# Patient Record
Sex: FEMALE | Race: WHITE | NOT HISPANIC OR LATINO | Employment: FULL TIME | URBAN - METROPOLITAN AREA
[De-identification: names, ages, dates, MRNs, and addresses within clinical notes are randomized per-mention and may not be internally consistent; named-entity substitution may affect disease eponyms.]

---

## 2021-02-11 ENCOUNTER — TRANSFERRED RECORDS (OUTPATIENT)
Dept: MULTI SPECIALTY CLINIC | Facility: CLINIC | Age: 31
End: 2021-02-11

## 2021-02-11 LAB — PAP-ABSTRACT: NORMAL

## 2021-06-01 ENCOUNTER — RECORDS - HEALTHEAST (OUTPATIENT)
Dept: ADMINISTRATIVE | Facility: CLINIC | Age: 31
End: 2021-06-01

## 2021-08-30 ENCOUNTER — OFFICE VISIT (OUTPATIENT)
Dept: FAMILY MEDICINE | Facility: OTHER | Age: 31
End: 2021-08-30
Attending: NURSE PRACTITIONER
Payer: COMMERCIAL

## 2021-08-30 VITALS
TEMPERATURE: 97.4 F | HEART RATE: 67 BPM | WEIGHT: 151.9 LBS | SYSTOLIC BLOOD PRESSURE: 116 MMHG | BODY MASS INDEX: 25.31 KG/M2 | RESPIRATION RATE: 18 BRPM | HEIGHT: 65 IN | DIASTOLIC BLOOD PRESSURE: 78 MMHG | OXYGEN SATURATION: 97 %

## 2021-08-30 DIAGNOSIS — T63.481A LOCAL REACTION TO INSECT STING, ACCIDENTAL OR UNINTENTIONAL, INITIAL ENCOUNTER: Primary | ICD-10-CM

## 2021-08-30 PROCEDURE — 99203 OFFICE O/P NEW LOW 30 MIN: CPT | Performed by: NURSE PRACTITIONER

## 2021-08-30 RX ORDER — BUSPIRONE HYDROCHLORIDE 5 MG/1
TABLET ORAL
COMMUNITY
Start: 2021-08-05 | End: 2022-09-09

## 2021-08-30 RX ORDER — ESCITALOPRAM OXALATE 20 MG/1
TABLET ORAL
COMMUNITY
Start: 2021-06-21 | End: 2022-09-09

## 2021-08-30 RX ORDER — PREDNISONE 20 MG/1
20 TABLET ORAL 2 TIMES DAILY
Qty: 6 TABLET | Refills: 0 | Status: SHIPPED | OUTPATIENT
Start: 2021-08-30 | End: 2021-09-02

## 2021-08-30 ASSESSMENT — MIFFLIN-ST. JEOR: SCORE: 1406.95

## 2021-08-30 ASSESSMENT — PAIN SCALES - GENERAL: PAINLEVEL: NO PAIN (0)

## 2021-08-31 NOTE — PROGRESS NOTES
"HPI:    Raina Horta is a 31 year old female  who presents to [facility] today for insect sting    She was stung by a wasp/hornet 2 days ago on her right arm.  She is having pain, redness, and itching initially.  Today the redness and itching has intensified.  She has had similar reactions in the past.  No fevers. She is concerned about a possible skin infection.  She denies any anaphylactic symptoms such as throat swelling, scratchiness or thickness, lip swelling or tingling, tongue thickness or swelling, chest tightness, shortness of breath or difficulty breathing.     She took Benadryl last night without relief.        History reviewed. No pertinent past medical history.  History reviewed. No pertinent surgical history.  Social History     Tobacco Use     Smoking status: Never Smoker     Smokeless tobacco: Never Used   Substance Use Topics     Alcohol use: Yes     Current Outpatient Medications   Medication Sig Dispense Refill     busPIRone (BUSPAR) 5 MG tablet TAKE 1 TABLET BY MOUTH ONE TIME A DAY.       escitalopram (LEXAPRO) 20 MG tablet TAKE 1 TABLET BY MOUTH ONE TIME A DAY.       Allergies   Allergen Reactions     Gentamycin [Gentamicin Sulfate]      Eye drops, swelling of the eyes and redness     Loracarbef Rash         Past medical history, past surgical history, current medications and allergies reviewed and accurate to the best of my knowledge.        ROS:  Refer to HPI    /78   Pulse 67   Temp 97.4  F (36.3  C) (Tympanic)   Resp 18   Ht 1.638 m (5' 4.5\")   Wt 68.9 kg (151 lb 14.4 oz)   SpO2 97%   BMI 25.67 kg/m      EXAM:  General Appearance: Well appearing adult female, appropriate appearance for age. No acute distress  Respiratory: normal chest wall and respirations.  Normal effort.  No cough appreciated.  Cardiovascular:  CMS intact to right upper extremity    Musculoskeletal:  Equal movement of bilateral upper extremities.  Equal movement of bilateral lower extremities.  " Normal gait.    Dermatological: Right inner forearm with area of swelling, erythema, warmth, and tenderness measuring 4 cm x 10 cm consistent with insect bite with local reaction.  Psychological: normal affect, alert, oriented, and pleasant.           ASSESSMENT/PLAN:    I have reviewed the nursing notes.  I have reviewed the findings, diagnosis, plan and need for follow up with the patient.    (T63.943E) Local reaction to insect sting, accidental or unintentional, initial encounter  (primary encounter diagnosis)  Comment: right forearm    - Prednisone Take 1 tablet (20 mg) by mouth 2 times daily for 3 days, Disp-6 tablet, R-0, E-Prescribe  Zyrtec 1-2 tabs daily in the morning  Benadryl 1-2 tabs daily at bedtime  May use over-the-counter Tylenol or ibuprofen PRN     Symptomatic treatment including cool compresses or ice packs, hydrocortisone cream, elevation, etc.     Discussed warning signs/symptoms indicative of need to f/u     Follow up if symptoms persist or worsen or concerns       I explained my diagnostic considerations and recommendations to the patient, who voiced understanding and agreement with the treatment plan. All questions were answered. We discussed potential side effects of any prescribed or recommended therapies, as well as expectations for response to treatments.

## 2021-08-31 NOTE — PATIENT INSTRUCTIONS
Prednisone one tab twice daily x 3 days, take with food    Benadryl at bedtime    Zyrtec 1 to 2 tabs in the morning    Cool packs for itching and swelling    Hydrocortisone for itching

## 2021-08-31 NOTE — NURSING NOTE
Chief Complaint   Patient presents with     Insect Bites     She was bit by flying insect on her right keri on Saturday.    Initial There were no vitals taken for this visit. There is no height or weight on file to calculate BMI.     FOOD SECURITY SCREENING QUESTIONS  Hunger Vital Signs:  Within the past 12 months we worried whether our food would run out before we got money to buy more. Never  Within the past 12 months the food we bought just didn't last and we didn't have money to get more. Never      Medication Reconciliation: Complete      Raji Hopkins LPN

## 2021-09-07 ENCOUNTER — OFFICE VISIT (OUTPATIENT)
Dept: FAMILY MEDICINE | Facility: OTHER | Age: 31
End: 2021-09-07
Attending: PHYSICIAN ASSISTANT
Payer: COMMERCIAL

## 2021-09-07 VITALS
HEART RATE: 75 BPM | SYSTOLIC BLOOD PRESSURE: 118 MMHG | TEMPERATURE: 98.7 F | WEIGHT: 146.1 LBS | HEIGHT: 65 IN | RESPIRATION RATE: 16 BRPM | DIASTOLIC BLOOD PRESSURE: 64 MMHG | BODY MASS INDEX: 24.34 KG/M2 | OXYGEN SATURATION: 98 %

## 2021-09-07 DIAGNOSIS — T63.441A BEE STING REACTION, ACCIDENTAL OR UNINTENTIONAL, INITIAL ENCOUNTER: Primary | ICD-10-CM

## 2021-09-07 PROCEDURE — 99213 OFFICE O/P EST LOW 20 MIN: CPT | Performed by: NURSE PRACTITIONER

## 2021-09-07 RX ORDER — PREDNISONE 20 MG/1
20 TABLET ORAL 2 TIMES DAILY
Qty: 6 TABLET | Refills: 0 | Status: SHIPPED | OUTPATIENT
Start: 2021-09-07 | End: 2021-09-10

## 2021-09-07 ASSESSMENT — PAIN SCALES - GENERAL: PAINLEVEL: MILD PAIN (2)

## 2021-09-07 ASSESSMENT — MIFFLIN-ST. JEOR: SCORE: 1380.65

## 2021-09-07 NOTE — NURSING NOTE
"Chief Complaint   Patient presents with     Insect Bites     Patient is here for a bee sting that happened yesterday on her left thumb. Patient now has some swelling to the hand. Patient took benadryl yesterday and today with no relief.     Initial /64   Pulse 75   Temp 98.7  F (37.1  C) (Tympanic)   Resp 16   Ht 1.638 m (5' 4.5\")   Wt 66.3 kg (146 lb 1.6 oz)   SpO2 98%   BMI 24.69 kg/m   Estimated body mass index is 24.69 kg/m  as calculated from the following:    Height as of this encounter: 1.638 m (5' 4.5\").    Weight as of this encounter: 66.3 kg (146 lb 1.6 oz).  Medication Reconciliation: complete    Brandi Montez LPN  "

## 2021-09-07 NOTE — PROGRESS NOTES
ASSESSMENT/PLAN:  1. Bee sting reaction, accidental or unintentional, initial encounter    - predniSONE (DELTASONE) 20 MG tablet; Take 1 tablet (20 mg) by mouth 2 times daily for 3 days  Dispense: 6 tablet; Refill: 0      Discussed with patient that based on her symptoms and physical exam findings that she will be prescribed prednisone 20 mg BID x 3 days.     Do not take Ibuprofen while on prednisone.  May use over-the-counter Tylenol PRN  Zyrtec (generic) 2 tabs every morning until swelling resolves  Benadryl at bedtime until swelling resolves  Cool compresses or ice packs frequent to areas of swelling     Discussed with patient signs/symptoms of delayed anaphylactic reaction ( lip, tongue, or throat numbness, tingling, swelling, or difficulty talking or swallowing or difficulty breathing, feeling of need to clear throat, etc) and need to seek immediate medical attention - call 911 or go to ER.      Discussed warning signs/symptoms indicative of need to f/u    Follow up if symptoms persist or worsen or concerns      I explained my diagnostic considerations and recommendations to the patient, who voiced understanding and agreement with the treatment plan. All questions were answered. We discussed potential side effects of any prescribed or recommended therapies, as well as expectations for response to treatments.        HPI:    Raina Horta is a 31 year old female  who presents to [facility] today for a bee sting to her left thumb. This occurred yesterday. The patient reports taking benadryl yesterday and applied a cool compress. She states that today her hand started swelling. The patient was just into the clinic last week for a bee sting. Left hand erythema, itching and swelling. Slowly worsening in regards to swelling and erythema today.     History reviewed. No pertinent past medical history.  History reviewed. No pertinent surgical history.  Social History     Tobacco Use     Smoking status: Never  "Smoker     Smokeless tobacco: Never Used   Substance Use Topics     Alcohol use: Yes     Current Outpatient Medications   Medication Sig Dispense Refill     busPIRone (BUSPAR) 5 MG tablet TAKE 1 TABLET BY MOUTH ONE TIME A DAY.       escitalopram (LEXAPRO) 20 MG tablet TAKE 1 TABLET BY MOUTH ONE TIME A DAY.       Allergies   Allergen Reactions     Gentamycin [Gentamicin Sulfate]      Eye drops, swelling of the eyes and redness     Loracarbef Rash         Past medical history, past surgical history, current medications and allergies reviewed and accurate to the best of my knowledge.        ROS:  Refer to HPI    /64   Pulse 75   Temp 98.7  F (37.1  C) (Tympanic)   Resp 16   Ht 1.638 m (5' 4.5\")   Wt 66.3 kg (146 lb 1.6 oz)   SpO2 98%   BMI 24.69 kg/m      EXAM:  General Appearance: Well appearing female, appropriate appearance for age. No acute distress  Orophayrnx: moist mucous membranes, posterior pharynx without erythema, tonsils without hypertrophy, no erythema, no exudates or petechiae, no post nasal drip seen, no trismus, voice clear.    Musculoskeletal:  Equal movement of bilateral upper extremities.  Equal movement of bilateral lower extremities.  Normal gait.    Dermatological: Erythema and swelling to the posterior aspect of her left hand.   Psychological: normal affect, alert, oriented, and pleasant.             "

## 2021-09-07 NOTE — PATIENT INSTRUCTIONS
"Do not take Ibuprofen while on prednisone.  May use over-the-counter Tylenol PRN  Zyrtec (generic) 2 tabs every morning until swelling resolves  Benadryl at bedtime until swelling resolves  Cool compresses or ice packs frequent to areas of swelling     Discussed with patient signs/symptoms of delayed anaphylactic reaction ( lip, tongue, or throat numbness, tingling, swelling, or difficulty talking or swallowing or difficulty breathing, feeling of need to clear throat, etc) and need to seek immediate medical attention - call 911 or go to ER.    Patient Education     Local Reaction to an Insect Sting    You have been stung or bitten by an insect. The insect s venom or body fluid is causing your skin to react in the area where you were stung or bitten. This often causes redness, itching, and swelling. This reaction will often fade over a few hours. But it can last a few days. An insect bite or sting can become infected 1 to 3 days later. So watch for the signs below. Sometimes it is hard to tell the difference between a local reaction to the insect bite or sting and an early infection. Your healthcare provider may give you antibiotics.  Common stinging insects that cause reactions are wasps, bees, yellow jackets, fire ants, and hornets. Common bites are from spiders, mosquitoes, fleas, or ticks. Other types of insects may be more common in different parts of the country or world.  Insect venom causes \"local\" toxic reactions in everyone. Allergic reactions occur only in those who are already sensitive to the venom. The severity of your reaction to the insect sting depends on the dose of venom and the degree to which you are already sensitive to it. Most people think of allergic reactions when someone has a rash or itchy skin. But most stings cause \"localized\" symptoms that are not allergic. These symptoms can include:    Rash, redness, welts, or blisters around the sting or bite    Itching, burning, stinging, or " pain    Swelling around the sting area, which may spread and become uncomfortable    Home care  Medicines  The healthcare provider may prescribe medicines to ease swelling, itching, and pain. Follow the provider s instructions when taking these medicines.    Diphenhydramine is an oral antihistamine you can find in stores.. You can use this medicine to reduce itching and swelling. The medicine may make you sleepy. So be careful using it in the daytime or when going to school, working, or driving. Don t use diphenhydramine if you have glaucoma or if you are a man with trouble urinating because of an enlarged prostate. Other antihistamines may cause less drowsiness. They may be better choices for daytime use. Ask your pharmacist for suggestions.    If you have large areas of localized swelling, you may be prescribed oral corticosteroids, such as prednisone. These can help decrease the swelling and discomfort.    Don t use diphenhydramine cream on your skin. In some people, it can cause more of a localized skin rash due to allergy to the cream.    Calamine lotion or oatmeal baths sometimes help with itching.    You may use acetaminophen or ibuprofen to control pain, unless another pain medicine was prescribed. Talk with your healthcare provider before using these medicines if you have chronic liver or kidney disease. Also talk with your provider if you ve had a stomach ulcer or gastrointestinal (GI) bleeding.    If you had a severe reaction, the provider may prescribe an epinephrine auto-injector. Epinephrine is an emergency medicine that will stop an allergic reaction from getting worse. Before you leave the hospital, be sure that you understand when and how to use this medicine.  General care      If itching is a problem, don t take hot showers or baths. Stay out of direct sunlight. These heat up your skin and will make the itching worse.    Use an ice pack to reduce local areas of redness, swelling, and itching. You  can make your own ice pack by putting ice cubes in a bag that seals and wrapping it in a thin towel. Don t put the ice directly on your skin, because it can damage the skin.    Try not to scratch any affected areas to prevent damage to the skin or infection.    If oral antibiotics or oral corticosteroids were prescribed, be sure to take them as directed until finished.  Tips for dealing with insect stings    Be aware that honeybees nest in trees. Wasps and yellow jackets nest in the ground, trees, or roof eaves.    If you are stung by a honeybee, a stinger may remain in your skin. Wasps, yellow jackets, and hornets don t leave a stinger behind. Move away from the nest area right away. The stinger of a honeybee releases a substance that will attract other bees to you. Once you are away from the nest, remove the stinger as quickly as possible.    After any sting, you may apply ice and take diphenhydramine or another antihistamine. If you develop any of the warning signs below, seek help right away.    If your reaction includes dizziness, fainting, or trouble breathing or swallowing, ask your healthcare provider if you need epinephrine auto-injectors.    Follow-up care  Follow up with your healthcare provider in 2 days, or as advised, if your symptoms don t start to get better.  Call 911  Call 911 if any of these occur:    Trouble breathing or swallowing, or wheezing    New or worsening swelling in the mouth, throat, or tongue    Hoarse voice or trouble speaking    Confusion    Severe drowsiness or trouble awakening    Fainting or loss of consciousness    Rapid heart rate    Low blood pressure    Feeling of doom    Nausea, vomiting, abdominal pain, or diarrhea    Vomiting blood, or large amounts of blood in stool    Seizure  When to seek medical advice  Call your healthcare provider or get medical care right away if any of these occur:    Spreading areas of itching, redness, or swelling    New or worse swelling in the  face, eyelids, or lips    Dizziness or weakness  Also call your provider right away if you have signs of infection:    Increasing pain, redness, or swelling    Fever of 100.4 F (38 C) or higher, or as directed by your healthcare provider    Fluid or pus draining from the sting area   Mariela last reviewed this educational content on 10/1/2019    9677-0351 [employer]. All rights reserved. This information is not intended as a substitute for professional medical care. Always follow your healthcare professional's instructions.

## 2021-10-12 ENCOUNTER — ALLIED HEALTH/NURSE VISIT (OUTPATIENT)
Dept: FAMILY MEDICINE | Facility: OTHER | Age: 31
End: 2021-10-12
Attending: FAMILY MEDICINE
Payer: COMMERCIAL

## 2021-10-12 DIAGNOSIS — R52 BODY ACHES: ICD-10-CM

## 2021-10-12 DIAGNOSIS — R53.83 FATIGUE: ICD-10-CM

## 2021-10-12 DIAGNOSIS — J02.0 STREPTOCOCCAL SORE THROAT: Primary | ICD-10-CM

## 2021-10-12 PROCEDURE — C9803 HOPD COVID-19 SPEC COLLECT: HCPCS

## 2021-10-12 PROCEDURE — U0005 INFEC AGEN DETEC AMPLI PROBE: HCPCS | Mod: ZL

## 2021-10-13 LAB — SARS-COV-2 RNA RESP QL NAA+PROBE: NEGATIVE

## 2021-11-27 ENCOUNTER — HEALTH MAINTENANCE LETTER (OUTPATIENT)
Age: 31
End: 2021-11-27

## 2022-02-11 ENCOUNTER — PATIENT OUTREACH (OUTPATIENT)
Dept: FAMILY MEDICINE | Facility: OTHER | Age: 32
End: 2022-02-11
Payer: COMMERCIAL

## 2022-02-11 NOTE — TELEPHONE ENCOUNTER
Patient Quality Outreach    Patient is due for the following:   Asthma  -  ACT needed and Asthma follow-up visit  Cervical Cancer Screening - PAP Needed  Depression  -  PHQ-9 Needed  Physical  - Due after 11/30/2020    NEXT STEPS:   Chart routed to abstraction.      Type of outreach:    Sent Gip[employer] message.      Questions for provider review:    None     Lisa Medley NP  Chart routed to N/A.

## 2022-09-06 ENCOUNTER — ALLIED HEALTH/NURSE VISIT (OUTPATIENT)
Dept: FAMILY MEDICINE | Facility: OTHER | Age: 32
End: 2022-09-06
Attending: PHYSICIAN ASSISTANT
Payer: COMMERCIAL

## 2022-09-06 DIAGNOSIS — J02.9 SORE THROAT: Primary | ICD-10-CM

## 2022-09-06 DIAGNOSIS — R09.81 STUFFY NOSE: ICD-10-CM

## 2022-09-06 DIAGNOSIS — R52 BODY ACHES: ICD-10-CM

## 2022-09-06 DIAGNOSIS — R05.9 COUGH: ICD-10-CM

## 2022-09-06 PROCEDURE — U0005 INFEC AGEN DETEC AMPLI PROBE: HCPCS | Mod: ZL

## 2022-09-06 PROCEDURE — C9803 HOPD COVID-19 SPEC COLLECT: HCPCS

## 2022-09-06 NOTE — NURSING NOTE
Patient swabbed for COVID-19 testing.  Holley Rivera LPN on 9/6/2022 at 12:54 PM    symptomatic sore throat, body ache, cough, stuffy nose

## 2022-09-07 LAB — SARS-COV-2 RNA RESP QL NAA+PROBE: POSITIVE

## 2022-09-19 ENCOUNTER — VIRTUAL VISIT (OUTPATIENT)
Dept: OBGYN | Facility: OTHER | Age: 32
End: 2022-09-19
Attending: OBSTETRICS & GYNECOLOGY
Payer: COMMERCIAL

## 2022-09-19 VITALS — HEIGHT: 65 IN | WEIGHT: 155 LBS | BODY MASS INDEX: 25.83 KG/M2

## 2022-09-19 DIAGNOSIS — Z32.01 PREGNANCY TEST POSITIVE: Primary | ICD-10-CM

## 2022-09-19 PROCEDURE — 99207 PR OB VISIT-NO CHARGE - GICH ONLY: CPT

## 2022-09-19 RX ORDER — PRENATAL VIT/IRON FUM/FOLIC AC 27MG-0.8MG
1 TABLET ORAL DAILY
COMMUNITY

## 2022-09-19 ASSESSMENT — PATIENT HEALTH QUESTIONNAIRE - PHQ9: SUM OF ALL RESPONSES TO PHQ QUESTIONS 1-9: 10

## 2022-09-19 NOTE — PROGRESS NOTES
Verbal consent obtained for telephone visit. Total length of call: 35 min    HPI:    This is a 32 year old female patient,  who was called today for OB Intake visit. Patient reports positive pregnancy test at home.     Obstetrical history and OB Demographics updated to the best of this nurse's ability based on patient report. PHQ-9 depression screening and routine Domestic Abuse screening completed. All immediate questions and concerns answered.    FOOD SECURITY SCREENING QUESTIONS:    The next two questions are to help us understand your food security.  If you are feeling you need any assistance in this area, we have resources available to support you today.    Hunger Vital Signs:  Within the past 12 months we worried whether our food would run out before we got money to buy more. Never  Within the past 12 months the food we bought just didn't last and we didn't have money to get more. Never    Last menstrual period is reported as Patient's last menstrual period was 2022 (exact date). MACKENZIE based on LMP is Estimated Date of Delivery: May 10, 2023.  Her cycles are regular, IUD eliminated menstration.  Her last menstrual period was normal.   Since her LMP, she has experienced  nausea, fatigue, headache and vaginal bleeding- light spotting now resolved 2 weeks ago approximately.       OBSTETRIC HISTORY:    OB History    Para Term  AB Living   1 0 0 0 0 0   SAB IAB Ectopic Multiple Live Births   0 0 0 0 0      # Outcome Date GA Lbr Luis Alberto/2nd Weight Sex Delivery Anes PTL Lv   1 Current                Age of first pregnancy: 32  Previous OB Provider: n/a  Previous Delivering Clinic: n/a  Release of Records: [facility]- patient to request record transfer.    Current delivery plan: GICH  Preferred OB Provider: Dawson Gomez MD  Current Primary Care Provider: Dr. Telma Card- [facility]  Pediatrician: will consider options    Additional History: IUD removed 2022.  Patient is tearful and states much anxiety surrounding work place since start of new job in [place], April 2022. Not sleeping well and states has some noted history of seasonal affective disorder. Stopped anxiety/depression medications as of April 2022 after feeling she was stable.     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     IgA deficiency diagnosis in [facility], medical alert to not receive an IgA blood transfusions.     Most recently has been dealing with COVID positive diagnosis as of 9/6/2022 and completed antibiotic course 9/18/22 from sinus infection.     Have you travelled during the pregnancy?No  Have your sexual partner(s) travelled during the pregnancy?No      HISTORY:   Planned Pregnancy: Yes  Marital Status:   Occupation: - full time   Living in Household: Spouse and Other:  step child    Father of the baby is involved.   Family and father of baby is supportive of current pregnancy.  Past Medical History of Father of Baby:No significant medical history    Past History:  Her past medical history   Past Medical History:   Diagnosis Date     Acid reflux      Anxiety      Depression      Fructose intolerance      IgA deficiency (H)     Diagnosed in [facility]. NO blood trasnfusions with IgA- medical alert   .      Her past surgical history:   Past Surgical History:   Procedure Laterality Date     SINUS SURGERY  2020       She has a history of  no prior pregnancies    Since her last LMP she denies use of alcohol, tobacco and street drugs.    Pap smear history: Last 3 Pap Results: No results found for: PAP    STD/STI history: No STD history    STD/STI symptoms: itching     Past medical, surgical, social and family history were reviewed and updated in EPIC.    Medications reviewed by this nurse. Current medication list:  Current Outpatient Medications   Medication Sig Dispense  Refill     Prenatal Vit-Fe Fumarate-FA (PRENATAL MULTIVITAMIN W/IRON) 27-0.8 MG tablet Take 1 tablet by mouth daily       The following medications were recommended to be discontinued due to Pregnancy Category D status: ibuprofen  Patient informed to contact her primary care provider as soon as possible to discuss a safer alternative.    Risk factors:  Moderate and moderately severe risks (consult with OB/Gyn)  Previous fetal or  demise: No  History of  delivery: No  History of heart disease Class I: No  Severe anemia, unresponsive to iron therapy: No  Pelvic mass or neoplasm: No  Previous : No  Hyper/hypothyroidism: No  History of postpartum hemorrhage requiring transfusion:No  History of Placenta Accreta: No    High Risk (Pregnancy managed by OB/Gyn)  Multiple pregnancy: No  Pre-gestational diabetes: No  Chronic Hypertension: No  Renal Failure: No  Heart disease, class II or greater: No  Rh Isoimmunization: unknown blood type  Chronic active hepatitis: No  Convulsive disorder, poorly controlled: No  Isoimmune thrombocytopenia: No  Pre-term premature rupture of membranes: No  Lupus or other autoimmune disorder: No  Human Immunodeficiency Virus: No      ASSESSMENT/PLAN:       ICD-10-CM    1. Pregnancy test positive  Z32.01        32 year old , 6w5d of pregnancy with MACKENZIE of 5/10/2023, Alternate MACKENZIE Entry    Per standing orders and scope of practice of this nurse, patient will have the following orders placed and completed prior to initial OB visit with the appropriate provider:    --early ultrasound for dating and viability ordered for 6+ weeks gestation based on LMP    --Quantitative Beta HCG and progesterone monitoring if indicated    Counseling given:     - Recommended weight gain for pregnancy: 15-25 lbs.   BMI < 18.5  28-40 lbs   18.5 - 24.9 25-35   25 - 29.9 15-25   > 30  < 15       PLAN/PATIENT INSTRUCTIONS:    Normal exercise.  Normal sexual activity.  Prenatal  vitamins.  Anticipated weight gain.    follow-up appointment with Dr. Dawson Gomez for pre- care and take multivitamin or pre- vitamins    Peyton Long RN.................................................. 2022 3:16 PM

## 2022-10-06 ENCOUNTER — PRENATAL OFFICE VISIT (OUTPATIENT)
Dept: OBGYN | Facility: OTHER | Age: 32
End: 2022-10-06
Attending: OBSTETRICS & GYNECOLOGY
Payer: COMMERCIAL

## 2022-10-06 VITALS
BODY MASS INDEX: 27.04 KG/M2 | WEIGHT: 160 LBS | DIASTOLIC BLOOD PRESSURE: 70 MMHG | HEART RATE: 77 BPM | SYSTOLIC BLOOD PRESSURE: 120 MMHG

## 2022-10-06 DIAGNOSIS — F43.22 ADJUSTMENT DISORDER WITH ANXIOUS MOOD: ICD-10-CM

## 2022-10-06 DIAGNOSIS — Z34.81 ENCOUNTER FOR SUPERVISION OF OTHER NORMAL PREGNANCY IN FIRST TRIMESTER: Primary | ICD-10-CM

## 2022-10-06 LAB
ALBUMIN UR-MCNC: NEGATIVE MG/DL
APPEARANCE UR: CLEAR
BILIRUB UR QL STRIP: NEGATIVE
C TRACH DNA SPEC QL PROBE+SIG AMP: NEGATIVE
CLUE CELLS: NORMAL
COLOR UR AUTO: ABNORMAL
GLUCOSE UR STRIP-MCNC: NEGATIVE MG/DL
HGB UR QL STRIP: NEGATIVE
KETONES UR STRIP-MCNC: NEGATIVE MG/DL
LEUKOCYTE ESTERASE UR QL STRIP: NEGATIVE
MUCOUS THREADS #/AREA URNS LPF: PRESENT /LPF
N GONORRHOEA DNA SPEC QL NAA+PROBE: NEGATIVE
NITRATE UR QL: NEGATIVE
PH UR STRIP: 6.5 [PH] (ref 5–9)
RBC URINE: <1 /HPF
SP GR UR STRIP: 1.02 (ref 1–1.03)
TRICHOMONAS, WET PREP: NORMAL
UROBILINOGEN UR STRIP-MCNC: NORMAL MG/DL
WBC URINE: 1 /HPF
WBC'S/HIGH POWER FIELD, WET PREP: NORMAL
YEAST, WET PREP: NORMAL

## 2022-10-06 PROCEDURE — 87491 CHLMYD TRACH DNA AMP PROBE: CPT | Mod: ZL | Performed by: OBSTETRICS & GYNECOLOGY

## 2022-10-06 PROCEDURE — 87210 SMEAR WET MOUNT SALINE/INK: CPT | Mod: ZL | Performed by: OBSTETRICS & GYNECOLOGY

## 2022-10-06 PROCEDURE — 99207 PR OB VISIT-NO CHARGE - GICH ONLY: CPT | Performed by: OBSTETRICS & GYNECOLOGY

## 2022-10-06 PROCEDURE — 87591 N.GONORRHOEAE DNA AMP PROB: CPT | Mod: ZL | Performed by: OBSTETRICS & GYNECOLOGY

## 2022-10-06 PROCEDURE — 81001 URINALYSIS AUTO W/SCOPE: CPT | Mod: ZL | Performed by: OBSTETRICS & GYNECOLOGY

## 2022-10-06 PROCEDURE — 87086 URINE CULTURE/COLONY COUNT: CPT | Mod: ZL | Performed by: OBSTETRICS & GYNECOLOGY

## 2022-10-06 PROCEDURE — 76801 OB US < 14 WKS SINGLE FETUS: CPT | Performed by: OBSTETRICS & GYNECOLOGY

## 2022-10-06 RX ORDER — CITALOPRAM HYDROBROMIDE 10 MG/1
10 TABLET ORAL DAILY
Qty: 90 TABLET | Refills: 3 | Status: SHIPPED | OUTPATIENT
Start: 2022-10-06 | End: 2023-02-23

## 2022-10-06 ASSESSMENT — PATIENT HEALTH QUESTIONNAIRE - PHQ9: SUM OF ALL RESPONSES TO PHQ QUESTIONS 1-9: 16

## 2022-10-06 ASSESSMENT — PAIN SCALES - GENERAL: PAINLEVEL: NO PAIN (0)

## 2022-10-06 NOTE — NURSING NOTE
"Chief Complaint   Patient presents with     Prenatal Care     Ob px     Patient presents for new OB appt.   Initial /70   Pulse 77   Wt 72.6 kg (160 lb)   LMP 08/03/2022 (Exact Date)   BMI 27.04 kg/m   Estimated body mass index is 27.04 kg/m  as calculated from the following:    Height as of 9/19/22: 1.638 m (5' 4.5\").    Weight as of this encounter: 72.6 kg (160 lb).  Medication Reconciliation: complete    FOOD SECURITY SCREENING QUESTIONS  Hunger Vital Signs:  Within the past 12 months we worried whether our food would run out before we got money to buy more. Never  Within the past 12 months the food we bought just didn't last and we didn't have money to get more. Never  Cintia Barriga LPN 10/6/2022 11:14 AM             Cintia Barriga LPN  "

## 2022-10-06 NOTE — PROGRESS NOTES
CC: New OB visit  HPI:  Raina Javier is  at 9w1d based on Patient's last menstrual period was 2022 (exact date)./first trimester US.  She notes issues of anxiety, stressful job as /, family , moved here from [facility] in the last year.  is from here. She has history of IgA deficiency and wears a medical alert bracelet for it.    OB History    Para Term  AB Living   1 0 0 0 0 0   SAB IAB Ectopic Multiple Live Births   0 0 0 0 0      # Outcome Date GA Lbr Luis Alberto/2nd Weight Sex Delivery Anes PTL Lv   1 Current              STI: (denies HSV, Hep C, Hep B, HIV, Syphilis, Chlamydia, Gonorrhea)  Last pap smear: No results found for: PAP    Past Medical History:   Diagnosis Date     Acid reflux      Anxiety      Depression      Fructose intolerance      IgA deficiency (H)     Diagnosed in . NO blood trasnfusions with IgA- medical alert      has a past surgical history that includes sinus surgery ().    Social History     Tobacco Use     Smoking status: Never Smoker     Smokeless tobacco: Never Used   Vaping Use     Vaping Use: Never used   Substance Use Topics     Alcohol use: Not Currently     Drug use: Never     Family History   Problem Relation Age of Onset     Skin Cancer Mother      Hashimoto's thyroiditis Mother      Autoimmune Disease Father      Hypertension Father      Skin Cancer Maternal Grandfather      Diabetes Sister         Type 1     Hashimoto's thyroiditis Sister          Current Outpatient Medications   Medication     Prenatal Vit-Fe Fumarate-FA (PRENATAL MULTIVITAMIN W/IRON) 27-0.8 MG tablet     No current facility-administered medications for this visit.     Allergies   Allergen Reactions     Gentamycin [Gentamicin Sulfate]      Eye drops, swelling of the eyes and redness     Loracarbef Rash     Immunization History   Administered Date(s) Administered     COVID-19,PF,Moderna Booster 2021     DT (PEDS  <7y) 10/23/2003     DTaP, Unspecified 1992, 1992, 1992, 07/26/1993, 03/07/1997     Flu, Unspecified 10/29/2007, 11/03/2008     HPV Quadrivalent 02/26/2007, 07/17/2007, 10/29/2007     HepA, Unspecified 11/01/2006, 07/17/2007     HepB, Unspecified 10/29/2002, 01/14/2003, 05/06/2003     Hib, Unspecified 1992, 1992, 1992, 04/16/1993     Influenza (H1N1) 10/24/2009     MMR 04/16/1993, 05/11/2004     Meningococcal (Menactra ) 11/01/2006     Poliovirus, inactivated (IPV) 1992, 1992, 07/26/1993, 03/07/1997     Td,adult,historic,unspecified 02/26/2007     Tdap (Adacel,Boostrix) 02/26/2007     Varicella 06/14/1995, 11/01/2006           REVIEW OF SYSTEMS  Neg except as above    EXAM: LMP 08/03/2022 (Exact Date)   Gen: NAD  CV: RRR with normal S1, S2, no GRM  Resp: CTA Bilaterally  Neck: supple without thyromegaly mass or noduels  Pelvic exam: vulva, urethra, anus, BUS normal, vagina and cervix normal, pelvimetry grossly normal, normal size, shape, position and mobility of uterus without adnexal mass or tenderness, exam was chaperoned by nurse.  Extremities: No TTP, no deformity  Neuro: CN II-XII intact grossly, moves all extremities  Psych: normal affect and mentation.    Recent Results (from the past 24 hour(s))   US OB < 14 Weeks ( OB/GYN ONLY)    Narrative    Early OB US    Indication: dates and viability    The [employer]i 50 W Ultrasound machine was used with the C6-2 probe.  With use of realtime 2-D and still images a viable diane intrauterine gestation is identified.    CRL: 2.81 cm at 9 weeks 5 days with EDC by US of 5/6/23  YS 6 mm      Fetal cardiac activity: 169 bpm by use of m-mode doppler    Right ovary: no cyst or mass seen  Left ovary: no cyst or mass seen  Uterus: retroverted and normal shape and size    Cul-de-sac: Normal without free fluid  Cervix: Normal without evidence of funneling    I/P: Viable diane intrauterine pregnancy at 9 weeks 5 days  with US EDC of 23 which is concordant with menstrual date of 5/10/23.           I/P  (Z34.81) Encounter for supervision of other normal pregnancy in first trimester  (primary encounter diagnosis)  Comment:   Plan: UA with Microscopic reflex to Culture, Urine         Culture, Treponema Abs w Reflex to RPR and         Titer, Rubella Antibody IgG, [facility]         Non-Invasive Prenatal Screening, [facility]         Carrier Screening, ABO/Rh type and screen, CBC         with Platelets & Differential, Chlamydia         trachomatis/Neisseria gonorrhoeae by PCR,         Hepatitis B surface antigen, Hepatitis C Screen        Reflex to HCV RNA Quant and Genotype, HIV         Antigen Antibody Combo, US OB < 14 Weeks (         OB/GYN ONLY), C US OB 1ST TRIMESTER MAT/FETAL,         SINGLE GESTATION - GICH              Discussed safety, nutrition, screening for cystic fibrosis, spina bifida, spinal muscular atrophy, quad screen, cffDNA screening as appropriate.  F/U scheduled, discussed call schedule rotation. Flu and COVID vaccinations recommended as per ACOG and [facility] guidelines.  Return visit in 1 month     Pregnancy risk factors include:    COVID infection at the start of pregnancy  Selective IgA deficiency- care with transfusion needs to be taken for washed RBC's to prevent anaphylactoid reaction with transfusions.    Dawson Gomez MD FACOG  11:09 AM 10/6/2022

## 2022-10-08 LAB — BACTERIA UR CULT: NO GROWTH

## 2022-10-12 LAB
ABO/RH(D): NORMAL
ANTIBODY SCREEN: NEGATIVE
SPECIMEN EXPIRATION DATE: NORMAL

## 2022-10-13 ENCOUNTER — TELEPHONE (OUTPATIENT)
Dept: OBGYN | Facility: OTHER | Age: 32
End: 2022-10-13

## 2022-10-13 ENCOUNTER — LAB (OUTPATIENT)
Dept: LAB | Facility: OTHER | Age: 32
End: 2022-10-13
Attending: OBSTETRICS & GYNECOLOGY
Payer: COMMERCIAL

## 2022-10-13 DIAGNOSIS — Z34.81 ENCOUNTER FOR SUPERVISION OF OTHER NORMAL PREGNANCY IN FIRST TRIMESTER: ICD-10-CM

## 2022-10-13 LAB
BASOPHILS # BLD AUTO: 0 10E3/UL (ref 0–0.2)
BASOPHILS NFR BLD AUTO: 1 %
EOSINOPHIL # BLD AUTO: 0.1 10E3/UL (ref 0–0.7)
EOSINOPHIL NFR BLD AUTO: 1 %
ERYTHROCYTE [DISTWIDTH] IN BLOOD BY AUTOMATED COUNT: 12.2 % (ref 10–15)
HCT VFR BLD AUTO: 36.6 % (ref 35–47)
HGB BLD-MCNC: 12.5 G/DL (ref 11.7–15.7)
IMM GRANULOCYTES # BLD: 0 10E3/UL
IMM GRANULOCYTES NFR BLD: 1 %
LYMPHOCYTES # BLD AUTO: 2.2 10E3/UL (ref 0.8–5.3)
LYMPHOCYTES NFR BLD AUTO: 25 %
MCH RBC QN AUTO: 30.3 PG (ref 26.5–33)
MCHC RBC AUTO-ENTMCNC: 34.2 G/DL (ref 31.5–36.5)
MCV RBC AUTO: 89 FL (ref 78–100)
MONOCYTES # BLD AUTO: 0.5 10E3/UL (ref 0–1.3)
MONOCYTES NFR BLD AUTO: 6 %
NEUTROPHILS # BLD AUTO: 5.8 10E3/UL (ref 1.6–8.3)
NEUTROPHILS NFR BLD AUTO: 66 %
NRBC # BLD AUTO: 0 10E3/UL
NRBC BLD AUTO-RTO: 0 /100
PLATELET # BLD AUTO: 234 10E3/UL (ref 150–450)
RBC # BLD AUTO: 4.12 10E6/UL (ref 3.8–5.2)
WBC # BLD AUTO: 8.7 10E3/UL (ref 4–11)

## 2022-10-13 PROCEDURE — 36415 COLL VENOUS BLD VENIPUNCTURE: CPT | Mod: ZL

## 2022-10-13 PROCEDURE — 87340 HEPATITIS B SURFACE AG IA: CPT | Mod: ZL

## 2022-10-13 PROCEDURE — 86780 TREPONEMA PALLIDUM: CPT | Mod: ZL

## 2022-10-13 PROCEDURE — 86901 BLOOD TYPING SEROLOGIC RH(D): CPT | Mod: ZL

## 2022-10-13 PROCEDURE — 86803 HEPATITIS C AB TEST: CPT | Mod: ZL

## 2022-10-13 PROCEDURE — 87389 HIV-1 AG W/HIV-1&-2 AB AG IA: CPT | Mod: ZL

## 2022-10-13 PROCEDURE — 86762 RUBELLA ANTIBODY: CPT | Mod: ZL

## 2022-10-13 PROCEDURE — 85025 COMPLETE CBC W/AUTO DIFF WBC: CPT | Mod: ZL

## 2022-10-13 PROCEDURE — 86850 RBC ANTIBODY SCREEN: CPT | Mod: ZL

## 2022-10-13 NOTE — TELEPHONE ENCOUNTER
"Patient call, verification of name and . Patient has concern of [facility] labs drawn this morning at [facility]. Patient verbalizes frustration and is tearful about desired to see form used for genetics testing so she could choose what to select including indication of gender decision. She did not visualize form, but this was obtained by lab with notification to patient that form was located and [facility] labs processed. Patient verbalizes she does not want to know gender. Questions \"what if I don't want to know results.\" Discussed writer would contact [facility] lab processing to inquire status of [facility] form and relay gender should be crossed off, if check and form is still available. Patient verbalizes \"It's not a big deal if form cannot be changed.\" Discussed lab results would be given via [employer] and/or phone call from [address] staff, once available. Advised patient to not look at [employer] result scan document under [facility] results, once resulted, due to indication of gender may be present. Patient verbalizes understanding, no further questions at this time.    Call made to lab processing, form was able to be altered to indicate NO gender.    [employer] message sent to patient to notify status of [facility] form.     Peyton Long RN ....................  10/13/2022   10:53 AM      "

## 2022-10-14 LAB
HCV AB SERPL QL IA: NONREACTIVE
HIV 1+2 AB+HIV1 P24 AG SERPL QL IA: NONREACTIVE
RUBV IGG SERPL QL IA: 4.85 INDEX
RUBV IGG SERPL QL IA: POSITIVE
T PALLIDUM AB SER QL: NONREACTIVE

## 2022-10-17 LAB — HBV SURFACE AG SERPL QL IA: REACTIVE

## 2022-10-21 LAB — SCANNED LAB RESULT: NORMAL

## 2022-10-26 LAB — SCANNED LAB RESULT: NORMAL

## 2022-11-07 ENCOUNTER — PRENATAL OFFICE VISIT (OUTPATIENT)
Dept: OBGYN | Facility: OTHER | Age: 32
End: 2022-11-07
Attending: OBSTETRICS & GYNECOLOGY
Payer: COMMERCIAL

## 2022-11-07 VITALS
BODY MASS INDEX: 27.36 KG/M2 | WEIGHT: 161.9 LBS | SYSTOLIC BLOOD PRESSURE: 122 MMHG | HEART RATE: 96 BPM | DIASTOLIC BLOOD PRESSURE: 72 MMHG

## 2022-11-07 DIAGNOSIS — Z34.92 NORMAL PREGNANCY IN SECOND TRIMESTER: ICD-10-CM

## 2022-11-07 DIAGNOSIS — B18.1 HBSAG (HEPATITIS B SURFACE ANTIGEN) POSITIVE, CURRENTLY PREGNANT (H): Primary | ICD-10-CM

## 2022-11-07 DIAGNOSIS — O98.419 HBSAG (HEPATITIS B SURFACE ANTIGEN) POSITIVE, CURRENTLY PREGNANT (H): Primary | ICD-10-CM

## 2022-11-07 PROCEDURE — 90686 IIV4 VACC NO PRSV 0.5 ML IM: CPT | Performed by: OBSTETRICS & GYNECOLOGY

## 2022-11-07 PROCEDURE — 99207 PR OB VISIT-NO CHARGE - GICH ONLY: CPT | Mod: 25 | Performed by: OBSTETRICS & GYNECOLOGY

## 2022-11-07 PROCEDURE — 0124A COVID-19,PF,PFIZER BOOSTER BIVALENT: CPT | Performed by: OBSTETRICS & GYNECOLOGY

## 2022-11-07 PROCEDURE — 90471 IMMUNIZATION ADMIN: CPT | Performed by: OBSTETRICS & GYNECOLOGY

## 2022-11-07 PROCEDURE — 91312 COVID-19,PF,PFIZER BOOSTER BIVALENT: CPT | Performed by: OBSTETRICS & GYNECOLOGY

## 2022-11-07 ASSESSMENT — PATIENT HEALTH QUESTIONNAIRE - PHQ9: SUM OF ALL RESPONSES TO PHQ QUESTIONS 1-9: 15

## 2022-11-07 ASSESSMENT — PAIN SCALES - GENERAL: PAINLEVEL: NO PAIN (0)

## 2022-11-07 NOTE — PROGRESS NOTES
CC: Recheck OB visit at 13w5d    HPI: Raina Javier presents for a routine OB visit now at 13w5d  Concerns: hep s ag pos, asymptomatic, retesting and further ag/ab, DNA testing today.    Patient notices normal fetal movement, denies contractions, vaginal bleeding or leaking of fluid.    OB History    Para Term  AB Living   1 0 0 0 0 0   SAB IAB Ectopic Multiple Live Births   0 0 0 0 0      # Outcome Date GA Lbr Luis Alberto/2nd Weight Sex Delivery Anes PTL Lv   1 Current              Current Outpatient Medications   Medication     Prenatal Vit-Fe Fumarate-FA (PRENATAL MULTIVITAMIN W/IRON) 27-0.8 MG tablet     citalopram (CELEXA) 10 MG tablet     No current facility-administered medications for this visit.     O: /72   Pulse 96   Wt 73.4 kg (161 lb 14.4 oz)   LMP 2022 (Exact Date)   BMI 27.36 kg/m    Body mass index is 27.36 kg/m .  See OB flow sheet  EXAM:  NAD    FHT:150 bpm    No results found for any visits on 22.    A/P: (O98.419,  B18.1) HBsAg (hepatitis B surface antigen) positive, currently pregnant (H)  (primary encounter diagnosis)  Comment:   Plan: Hepatitis B Surface Antigen, Hepatitis B Core         Antibody, Hep B Virus DNA Quant Real Time PCR,         Hepatitis B Surface Antibody, Hepatic Function         Panel          (Z34.92) Normal pregnancy in second trimester  Comment:   Plan: FLU SHOT 6 MOS - 50 YRS (FLUZONE)  COVID booster today as well.          Recheck in 6 weeks    Problem List:   Pregnancy risk factors include:    COVID infection at the start of pregnancy- boosted today 2022- growth scan 32 weeks  Selective IgA deficiency- care with transfusion needs to be taken for washed RBC's to prevent anaphylactoid reaction with transfusions.  Hep B S Ag pos    Dawson Gomez MD FACOG  3:22 PM 2022

## 2022-11-07 NOTE — NURSING NOTE
"Chief Complaint   Patient presents with     Prenatal Care     13 week 5 day   Patient here for 13 week 5 days. Patient had questions about flying, exercise, diet and blood work.     Initial /72   Pulse 96   Wt 73.4 kg (161 lb 14.4 oz)   LMP 08/03/2022 (Exact Date)   BMI 27.36 kg/m   Estimated body mass index is 27.36 kg/m  as calculated from the following:    Height as of 9/19/22: 1.638 m (5' 4.5\").    Weight as of this encounter: 73.4 kg (161 lb 14.4 oz).  Medication Reconciliation: complete        Cintia Barriga LPN  "

## 2022-12-20 ENCOUNTER — HOSPITAL ENCOUNTER (OUTPATIENT)
Dept: ULTRASOUND IMAGING | Facility: OTHER | Age: 32
Discharge: HOME | End: 2022-12-20
Attending: OBSTETRICS & GYNECOLOGY
Payer: COMMERCIAL

## 2022-12-20 ENCOUNTER — PRENATAL OFFICE VISIT (OUTPATIENT)
Dept: OBGYN | Facility: OTHER | Age: 32
End: 2022-12-20
Attending: OBSTETRICS & GYNECOLOGY
Payer: COMMERCIAL

## 2022-12-20 VITALS
SYSTOLIC BLOOD PRESSURE: 122 MMHG | BODY MASS INDEX: 28.39 KG/M2 | HEART RATE: 75 BPM | DIASTOLIC BLOOD PRESSURE: 72 MMHG | WEIGHT: 168 LBS

## 2022-12-20 DIAGNOSIS — B18.1 HBSAG (HEPATITIS B SURFACE ANTIGEN) POSITIVE, CURRENTLY PREGNANT (H): ICD-10-CM

## 2022-12-20 DIAGNOSIS — O98.419 HBSAG (HEPATITIS B SURFACE ANTIGEN) POSITIVE, CURRENTLY PREGNANT (H): ICD-10-CM

## 2022-12-20 DIAGNOSIS — Z34.92 NORMAL PREGNANCY IN SECOND TRIMESTER: ICD-10-CM

## 2022-12-20 DIAGNOSIS — Z34.92 NORMAL PREGNANCY IN SECOND TRIMESTER: Primary | ICD-10-CM

## 2022-12-20 LAB
ALBUMIN SERPL BCG-MCNC: 4.1 G/DL (ref 3.5–5.2)
ALP SERPL-CCNC: 48 U/L (ref 35–104)
ALT SERPL W P-5'-P-CCNC: 39 U/L (ref 10–35)
AST SERPL W P-5'-P-CCNC: 33 U/L (ref 10–35)
BILIRUB DIRECT SERPL-MCNC: <0.2 MG/DL (ref 0–0.3)
BILIRUB SERPL-MCNC: <0.2 MG/DL
PROT SERPL-MCNC: 6.6 G/DL (ref 6.4–8.3)

## 2022-12-20 PROCEDURE — 76805 OB US >/= 14 WKS SNGL FETUS: CPT

## 2022-12-20 PROCEDURE — 87517 HEPATITIS B DNA QUANT: CPT | Mod: ZL | Performed by: OBSTETRICS & GYNECOLOGY

## 2022-12-20 PROCEDURE — 80076 HEPATIC FUNCTION PANEL: CPT | Mod: ZL | Performed by: OBSTETRICS & GYNECOLOGY

## 2022-12-20 PROCEDURE — 36415 COLL VENOUS BLD VENIPUNCTURE: CPT | Mod: ZL | Performed by: OBSTETRICS & GYNECOLOGY

## 2022-12-20 PROCEDURE — 86706 HEP B SURFACE ANTIBODY: CPT | Mod: ZL | Performed by: OBSTETRICS & GYNECOLOGY

## 2022-12-20 PROCEDURE — 99207 PR OB VISIT-NO CHARGE - GICH ONLY: CPT | Performed by: OBSTETRICS & GYNECOLOGY

## 2022-12-20 PROCEDURE — 87340 HEPATITIS B SURFACE AG IA: CPT | Mod: ZL | Performed by: OBSTETRICS & GYNECOLOGY

## 2022-12-20 PROCEDURE — 86704 HEP B CORE ANTIBODY TOTAL: CPT | Mod: ZL | Performed by: OBSTETRICS & GYNECOLOGY

## 2022-12-20 ASSESSMENT — PAIN SCALES - GENERAL: PAINLEVEL: NO PAIN (0)

## 2022-12-20 NOTE — NURSING NOTE
"Chief Complaint   Patient presents with     Prenatal Care     19 week 6 days     Pt presents to clinic today for prenatal care 19 week 6 days and doing well with no concerns.   Initial /72   Pulse 75   Wt 76.2 kg (168 lb)   LMP 08/03/2022 (Exact Date)   BMI 28.39 kg/m   Estimated body mass index is 28.39 kg/m  as calculated from the following:    Height as of 9/19/22: 1.638 m (5' 4.5\").    Weight as of this encounter: 76.2 kg (168 lb).  Medication Reconciliation: complete          Cintia Barriga LPN  "

## 2022-12-20 NOTE — PROGRESS NOTES
CC: Recheck OB visit at 19w6d    HPI: Raina Javier presents for a routine OB visit now at 19w6d  Concerns: No, feels well, moods are better  Patient notices normal fetal movement, denies contractions, vaginal bleeding or leaking of fluid.    OB History    Para Term  AB Living   1 0 0 0 0 0   SAB IAB Ectopic Multiple Live Births   0 0 0 0 0      # Outcome Date GA Lbr Luis Alberto/2nd Weight Sex Delivery Anes PTL Lv   1 Current              Current Outpatient Medications   Medication     citalopram (CELEXA) 10 MG tablet     Prenatal Vit-Fe Fumarate-FA (PRENATAL MULTIVITAMIN W/IRON) 27-0.8 MG tablet     No current facility-administered medications for this visit.     O: /72   Pulse 75   Wt 76.2 kg (168 lb)   LMP 2022 (Exact Date)   BMI 28.39 kg/m    Body mass index is 28.39 kg/m .  See OB flow sheet  EXAM:  NAD    Results for orders placed or performed during the hospital encounter of 22   US OB > 14 Weeks     Status: None    Narrative    OB ULTRASOUND - Transabdominal     Clinical: 32 years Female , Normal pregnancy in second trimester.   Gestation:  1  Comparison: 10/6/2022  Presentation: Variable  Cardiac Activity:  Regular at 150 bpm  Movement:  Yes  Placenta: Anterior ndGndrndanddndend:nd nd2nd Previa:  No Previa  Cervix:  3.9 cm in length  Amniotic Fluid: Normal : MVP Measurements ([place]):  BPD: 85%  HC: 56%  AC: 83%  FL: 35%    Estimated Fetal Weight:  353 grams  HC/AC:  1.10  US age (current):  20 weeks 4 days  Gestational age:  19 weeks 6 days  US EDC (preferred):  5/10/2023   %WT for EGA (preferred dating):  77 %    Structural Survey:  Head:  Unremarkable  Face: Unremarkable  Spine:  Unremarkable  Stomach:  Unremarkable  Kidneys:  Unremarkable  Bladder:  Unremarkable  3 Vessel Cord:  Unremarkable  Cord Insertion:  Unremarkable  4CH, LVOT, RVOT:  Unremarkable  Ant. Abd Wall:  Unremarkable  Diaphragm:  Unremarkable  Situs: Unremarkable      Impression    IMPRESSION: Single living  intrauterine gestation demonstrating  appropriate interval growth. No anatomic abnormalities identified.    YULIANA ODEN MD         SYSTEM ID:  [id number]     EIF noted on US images, and limited spine views    A/P: 19w6d    Recheck in 4 weeks    Problem List:   Pregnancy risk factors include:    COVID infection at the start of pregnancy- boosted today 2022- growth scan 32 weeks  Selective IgA deficiency- care with transfusion needs to be taken for washed RBC's to prevent anaphylactoid reaction with transfusions.  Hep B S Ag pos- recheck plus Hep B panel, LFT's 2022      Dawson Gomez MD FACOG  3:56 PM 2022

## 2022-12-21 LAB
HBV CORE AB SERPL QL IA: NONREACTIVE
HBV SURFACE AB SERPL IA-ACNC: 740.45 M[IU]/ML
HBV SURFACE AB SERPL IA-ACNC: REACTIVE M[IU]/ML

## 2022-12-22 LAB
HBV DNA SERPL NAA+PROBE-ACNC: NOT DETECTED IU/ML
HBV SURFACE AG SERPL QL IA: REACTIVE

## 2023-01-03 ENCOUNTER — TELEPHONE (OUTPATIENT)
Dept: OBGYN | Facility: OTHER | Age: 33
End: 2023-01-03

## 2023-01-14 ENCOUNTER — HEALTH MAINTENANCE LETTER (OUTPATIENT)
Age: 33
End: 2023-01-14

## 2023-01-23 ENCOUNTER — HOSPITAL ENCOUNTER (OUTPATIENT)
Dept: ULTRASOUND IMAGING | Facility: OTHER | Age: 33
Discharge: HOME | End: 2023-01-23
Attending: OBSTETRICS & GYNECOLOGY
Payer: COMMERCIAL

## 2023-01-23 ENCOUNTER — PRENATAL OFFICE VISIT (OUTPATIENT)
Dept: OBGYN | Facility: OTHER | Age: 33
End: 2023-01-23
Attending: OBSTETRICS & GYNECOLOGY
Payer: COMMERCIAL

## 2023-01-23 VITALS
HEART RATE: 105 BPM | DIASTOLIC BLOOD PRESSURE: 72 MMHG | WEIGHT: 177 LBS | BODY MASS INDEX: 29.91 KG/M2 | SYSTOLIC BLOOD PRESSURE: 132 MMHG

## 2023-01-23 DIAGNOSIS — Z34.92 NORMAL PREGNANCY IN SECOND TRIMESTER: ICD-10-CM

## 2023-01-23 DIAGNOSIS — B97.89 SORE THROAT (VIRAL): ICD-10-CM

## 2023-01-23 DIAGNOSIS — J02.8 SORE THROAT (VIRAL): ICD-10-CM

## 2023-01-23 DIAGNOSIS — U07.1 INFECTION DUE TO 2019 NOVEL CORONAVIRUS: ICD-10-CM

## 2023-01-23 DIAGNOSIS — E74.39 GLUCOSE INTOLERANCE: ICD-10-CM

## 2023-01-23 DIAGNOSIS — Z34.92 NORMAL PREGNANCY IN SECOND TRIMESTER: Primary | ICD-10-CM

## 2023-01-23 LAB
BASOPHILS # BLD AUTO: 0 10E3/UL (ref 0–0.2)
BASOPHILS NFR BLD AUTO: 0 %
EOSINOPHIL # BLD AUTO: 0.1 10E3/UL (ref 0–0.7)
EOSINOPHIL NFR BLD AUTO: 1 %
ERYTHROCYTE [DISTWIDTH] IN BLOOD BY AUTOMATED COUNT: 11.7 % (ref 10–15)
GLUCOSE 1H P 50 G GLC PO SERPL-MCNC: 143 MG/DL (ref 70–129)
HCT VFR BLD AUTO: 34.4 % (ref 35–47)
HGB BLD-MCNC: 11.8 G/DL (ref 11.7–15.7)
IMM GRANULOCYTES # BLD: 0.1 10E3/UL
IMM GRANULOCYTES NFR BLD: 2 %
LYMPHOCYTES # BLD AUTO: 1.5 10E3/UL (ref 0.8–5.3)
LYMPHOCYTES NFR BLD AUTO: 19 %
MCH RBC QN AUTO: 30.6 PG (ref 26.5–33)
MCHC RBC AUTO-ENTMCNC: 34.3 G/DL (ref 31.5–36.5)
MCV RBC AUTO: 89 FL (ref 78–100)
MONOCYTES # BLD AUTO: 0.7 10E3/UL (ref 0–1.3)
MONOCYTES NFR BLD AUTO: 9 %
NEUTROPHILS # BLD AUTO: 5.7 10E3/UL (ref 1.6–8.3)
NEUTROPHILS NFR BLD AUTO: 69 %
NRBC # BLD AUTO: 0 10E3/UL
NRBC BLD AUTO-RTO: 0 /100
PLATELET # BLD AUTO: 200 10E3/UL (ref 150–450)
RBC # BLD AUTO: 3.85 10E6/UL (ref 3.8–5.2)
SARS-COV-2 RNA RESP QL NAA+PROBE: NEGATIVE
WBC # BLD AUTO: 8.2 10E3/UL (ref 4–11)

## 2023-01-23 PROCEDURE — 82950 GLUCOSE TEST: CPT | Mod: ZL | Performed by: OBSTETRICS & GYNECOLOGY

## 2023-01-23 PROCEDURE — 36415 COLL VENOUS BLD VENIPUNCTURE: CPT | Mod: ZL | Performed by: OBSTETRICS & GYNECOLOGY

## 2023-01-23 PROCEDURE — C9803 HOPD COVID-19 SPEC COLLECT: HCPCS | Performed by: OBSTETRICS & GYNECOLOGY

## 2023-01-23 PROCEDURE — 99207 PR OB VISIT-NO CHARGE - GICH ONLY: CPT | Performed by: OBSTETRICS & GYNECOLOGY

## 2023-01-23 PROCEDURE — U0005 INFEC AGEN DETEC AMPLI PROBE: HCPCS | Mod: ZL | Performed by: OBSTETRICS & GYNECOLOGY

## 2023-01-23 PROCEDURE — 76816 OB US FOLLOW-UP PER FETUS: CPT

## 2023-01-23 PROCEDURE — 85025 COMPLETE CBC W/AUTO DIFF WBC: CPT | Mod: ZL | Performed by: OBSTETRICS & GYNECOLOGY

## 2023-01-23 PROCEDURE — 86780 TREPONEMA PALLIDUM: CPT | Mod: ZL | Performed by: OBSTETRICS & GYNECOLOGY

## 2023-01-23 ASSESSMENT — PAIN SCALES - GENERAL: PAINLEVEL: NO PAIN (0)

## 2023-01-23 NOTE — PROGRESS NOTES
CC: Recheck OB visit at 24w5d    HPI: Raina Javier presents for a routine OB visit now at 24w5d  Concerns:   Reviewed Hep B S Ag as pos with neg DNA for Hep B, normal LFT's c/w false pos ag test. She has ab's c/w past vaccination. Recheck prn.    Patient notices normal fetal movement, denies contractions, vaginal bleeding or leaking of fluid.  Questions were answered.    Notes sore throat today, recently was treated for sinus infection with amoxicillin, she gets them frequently.    OB History    Para Term  AB Living   1 0 0 0 0 0   SAB IAB Ectopic Multiple Live Births   0 0 0 0 0      # Outcome Date GA Lbr Luis Alberto/2nd Weight Sex Delivery Anes PTL Lv   1 Current              Current Outpatient Medications   Medication     citalopram (CELEXA) 10 MG tablet     Prenatal Vit-Fe Fumarate-FA (PRENATAL MULTIVITAMIN W/IRON) 27-0.8 MG tablet     No current facility-administered medications for this visit.       O: /72   Pulse 105   Wt 80.3 kg (177 lb)   LMP 2022 (Exact Date)   BMI 29.91 kg/m    Body mass index is 29.91 kg/m .  See OB flow sheet  EXAM:  NAD  FH:24 cm  FHT:145 bpm    No results found for any visits on 23.    A/P: (Z34.92) Normal pregnancy in second trimester  (primary encounter diagnosis)  Comment:   Plan: Glucose tolerance, gest screen, 1 hour,         Treponema Ab w Reflex to RPR and Titer, CBC and        Differential        .  COVID test today.  Paclovid prn    Recheck in 4 weeks    Problem List:   Pregnancy risk factors include:    COVID infection at the start of pregnancy- boosted today 2022- growth scan 32 weeks  Selective IgA deficiency- care with transfusion needs to be taken for washed RBC's to prevent anaphylactoid reaction with transfusions.  Hep B S Ag pos- recheck plus Hep B panel, LFT's 2022    Dawson Gomez MD FACOG  2:33 PM 2023

## 2023-01-23 NOTE — NURSING NOTE
"Chief Complaint   Patient presents with     Prenatal Care     24 week 5 days     Pt presents to clinic today for prenatal care 24 week 5 days and doing well with no concerns.   Initial /72   Pulse 105   Wt 80.3 kg (177 lb)   LMP 08/03/2022 (Exact Date)   BMI 29.91 kg/m   Estimated body mass index is 29.91 kg/m  as calculated from the following:    Height as of 9/19/22: 1.638 m (5' 4.5\").    Weight as of this encounter: 80.3 kg (177 lb).  Medication Reconciliation: complete          Cintia Barriga LPN  "

## 2023-01-24 LAB — T PALLIDUM AB SER QL: NONREACTIVE

## 2023-01-27 ENCOUNTER — LAB (OUTPATIENT)
Dept: LAB | Facility: OTHER | Age: 33
End: 2023-01-27
Attending: OBSTETRICS & GYNECOLOGY
Payer: COMMERCIAL

## 2023-01-27 DIAGNOSIS — E74.39 GLUCOSE INTOLERANCE: ICD-10-CM

## 2023-01-27 LAB
GESTATIONAL GTT 1 HR POST DOSE: 129 MG/DL (ref 60–179)
GESTATIONAL GTT 2 HR POST DOSE: 139 MG/DL (ref 60–154)
GESTATIONAL GTT 3 HR POST DOSE: 103 MG/DL (ref 60–139)
GLUCOSE P FAST SERPL-MCNC: 73 MG/DL (ref 60–94)

## 2023-01-27 PROCEDURE — 36415 COLL VENOUS BLD VENIPUNCTURE: CPT | Mod: ZL

## 2023-01-27 PROCEDURE — 82951 GLUCOSE TOLERANCE TEST (GTT): CPT | Mod: ZL

## 2023-01-27 PROCEDURE — 82947 ASSAY GLUCOSE BLOOD QUANT: CPT | Mod: ZL

## 2023-01-27 PROCEDURE — 82950 GLUCOSE TEST: CPT | Mod: ZL

## 2023-02-23 ENCOUNTER — PRENATAL OFFICE VISIT (OUTPATIENT)
Dept: OBGYN | Facility: OTHER | Age: 33
End: 2023-02-23
Attending: OBSTETRICS & GYNECOLOGY
Payer: COMMERCIAL

## 2023-02-23 VITALS
WEIGHT: 184 LBS | DIASTOLIC BLOOD PRESSURE: 64 MMHG | HEART RATE: 76 BPM | BODY MASS INDEX: 31.1 KG/M2 | SYSTOLIC BLOOD PRESSURE: 132 MMHG

## 2023-02-23 DIAGNOSIS — O98.419 HBSAG (HEPATITIS B SURFACE ANTIGEN) POSITIVE, CURRENTLY PREGNANT (H): ICD-10-CM

## 2023-02-23 DIAGNOSIS — Z34.92 NORMAL PREGNANCY IN SECOND TRIMESTER: Primary | ICD-10-CM

## 2023-02-23 DIAGNOSIS — F43.22 ADJUSTMENT DISORDER WITH ANXIOUS MOOD: ICD-10-CM

## 2023-02-23 DIAGNOSIS — B18.1 HBSAG (HEPATITIS B SURFACE ANTIGEN) POSITIVE, CURRENTLY PREGNANT (H): ICD-10-CM

## 2023-02-23 DIAGNOSIS — R53.83 OTHER FATIGUE: ICD-10-CM

## 2023-02-23 LAB
ALBUMIN SERPL BCG-MCNC: 3.8 G/DL (ref 3.5–5.2)
ALP SERPL-CCNC: 76 U/L (ref 35–104)
ALT SERPL W P-5'-P-CCNC: 23 U/L (ref 10–35)
AST SERPL W P-5'-P-CCNC: 20 U/L (ref 10–35)
BILIRUB DIRECT SERPL-MCNC: <0.2 MG/DL (ref 0–0.3)
BILIRUB SERPL-MCNC: 0.2 MG/DL
ERYTHROCYTE [DISTWIDTH] IN BLOOD BY AUTOMATED COUNT: 12.1 % (ref 10–15)
HCT VFR BLD AUTO: 34.1 % (ref 35–47)
HGB BLD-MCNC: 11.6 G/DL (ref 11.7–15.7)
MCH RBC QN AUTO: 30.1 PG (ref 26.5–33)
MCHC RBC AUTO-ENTMCNC: 34 G/DL (ref 31.5–36.5)
MCV RBC AUTO: 88 FL (ref 78–100)
PLATELET # BLD AUTO: 199 10E3/UL (ref 150–450)
PROT SERPL-MCNC: 6.6 G/DL (ref 6.4–8.3)
RBC # BLD AUTO: 3.86 10E6/UL (ref 3.8–5.2)
TSH SERPL DL<=0.005 MIU/L-ACNC: 1.23 UIU/ML (ref 0.3–4.2)
WBC # BLD AUTO: 9.4 10E3/UL (ref 4–11)

## 2023-02-23 PROCEDURE — 90715 TDAP VACCINE 7 YRS/> IM: CPT | Performed by: OBSTETRICS & GYNECOLOGY

## 2023-02-23 PROCEDURE — 87517 HEPATITIS B DNA QUANT: CPT | Mod: ZL | Performed by: OBSTETRICS & GYNECOLOGY

## 2023-02-23 PROCEDURE — 99207 PR OB VISIT-NO CHARGE - GICH ONLY: CPT | Performed by: OBSTETRICS & GYNECOLOGY

## 2023-02-23 PROCEDURE — 36415 COLL VENOUS BLD VENIPUNCTURE: CPT | Mod: ZL | Performed by: OBSTETRICS & GYNECOLOGY

## 2023-02-23 PROCEDURE — 85027 COMPLETE CBC AUTOMATED: CPT | Mod: ZL | Performed by: OBSTETRICS & GYNECOLOGY

## 2023-02-23 PROCEDURE — 59025 FETAL NON-STRESS TEST: CPT | Performed by: OBSTETRICS & GYNECOLOGY

## 2023-02-23 PROCEDURE — 84443 ASSAY THYROID STIM HORMONE: CPT | Mod: ZL | Performed by: OBSTETRICS & GYNECOLOGY

## 2023-02-23 PROCEDURE — 90471 IMMUNIZATION ADMIN: CPT | Performed by: OBSTETRICS & GYNECOLOGY

## 2023-02-23 PROCEDURE — 86706 HEP B SURFACE ANTIBODY: CPT | Mod: ZL | Performed by: OBSTETRICS & GYNECOLOGY

## 2023-02-23 PROCEDURE — 82040 ASSAY OF SERUM ALBUMIN: CPT | Mod: ZL | Performed by: OBSTETRICS & GYNECOLOGY

## 2023-02-23 RX ORDER — CITALOPRAM HYDROBROMIDE 10 MG/1
20 TABLET ORAL DAILY
Qty: 90 TABLET | Refills: 3 | Status: ON HOLD | OUTPATIENT
Start: 2023-02-23 | End: 2023-05-14

## 2023-02-23 ASSESSMENT — PAIN SCALES - GENERAL: PAINLEVEL: NO PAIN (0)

## 2023-02-23 NOTE — NURSING NOTE
Chief Complaint   Patient presents with     Prenatal Care     29w1d       Medication Reconciliation: complete   Decreased fetal movement and mood has not been great.     Elo Dawson LPN........................2/23/2023  9:40 AM

## 2023-02-23 NOTE — PROGRESS NOTES
CC: Recheck OB visit at 29w1d    HPI: Raina Javier presents for a routine OB visit now at 29w1d  Concerns: decreased movement  Patient denies contractions, vaginal bleeding or leaking of fluid.  Struggling with moods and fatigue    OB History    Para Term  AB Living   1 0 0 0 0 0   SAB IAB Ectopic Multiple Live Births   0 0 0 0 0      # Outcome Date GA Lbr Luis Alberto/2nd Weight Sex Delivery Anes PTL Lv   1 Current              Current Outpatient Medications   Medication     citalopram (CELEXA) 10 MG tablet     Prenatal Vit-Fe Fumarate-FA (PRENATAL MULTIVITAMIN W/IRON) 27-0.8 MG tablet     No current facility-administered medications for this visit.     O: /64 (BP Location: Right arm, Patient Position: Sitting, Cuff Size: Adult Regular)   Pulse 76   Wt 83.5 kg (184 lb)   LMP 2022 (Exact Date)   BMI 31.10 kg/m    Body mass index is 31.1 kg/m .  See OB flow sheet  EXAM:  NAD  FH:29 cm  FHT: 150's bpm  Normal activity and fluid on bedside US    No results found for any visits on 23.     NST doucmentation:  Indication: decreased fetal movement    Duration: 30 min     29w1d  FHR baseline: 130'sd with moderate variability  Accelerations: y  Decelerations: n  Contractions: n    Category 1    A/P: (Z34.92) Normal pregnancy in second trimester  (primary encounter diagnosis)  Comment:   Plan:   Recheck in 4 weeks  F/u US for growth due to COVID infection during pregnancy    29w1d  Fatigue  CBC, LFT's, TSH, Hep B S Ag and DNA recheck today for fatigue and history of Hep B S Ag pos.    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  Hep B S Ag pos- recheck plus Hep B panel, LFT's 2022    Dawson Gomez MD FACOG  9:52 AM 2023

## 2023-02-24 LAB
HBV DNA SERPL NAA+PROBE-ACNC: NOT DETECTED IU/ML
HBV SURFACE AB SERPL IA-ACNC: 998.28 M[IU]/ML
HBV SURFACE AB SERPL IA-ACNC: REACTIVE M[IU]/ML

## 2023-03-06 ENCOUNTER — PRENATAL OFFICE VISIT (OUTPATIENT)
Dept: OBGYN | Facility: OTHER | Age: 33
End: 2023-03-06
Attending: OBSTETRICS & GYNECOLOGY
Payer: COMMERCIAL

## 2023-03-06 VITALS
DIASTOLIC BLOOD PRESSURE: 72 MMHG | BODY MASS INDEX: 31.1 KG/M2 | HEART RATE: 96 BPM | WEIGHT: 184 LBS | SYSTOLIC BLOOD PRESSURE: 122 MMHG

## 2023-03-06 DIAGNOSIS — O36.8130 DECREASED FETAL MOVEMENTS IN THIRD TRIMESTER, SINGLE OR UNSPECIFIED FETUS: Primary | ICD-10-CM

## 2023-03-06 PROCEDURE — 99207 PR OB VISIT-NO CHARGE - GICH ONLY: CPT | Performed by: OBSTETRICS & GYNECOLOGY

## 2023-03-06 ASSESSMENT — PAIN SCALES - GENERAL: PAINLEVEL: MILD PAIN (3)

## 2023-03-06 NOTE — NURSING NOTE
"Chief Complaint   Patient presents with     Prenatal Care     30 week 5 days     Pt presents to clinic today for prenatal care 30 week 5 day. Pt denies any bleeding, or leakage of fluid at this time. States baby is moving good. Patient denies contractions.  Patient c/o of pain in pelvic area that comes and goes.   Initial /70   Pulse 96   Wt 83.5 kg (184 lb)   LMP 08/03/2022 (Exact Date)   BMI 31.10 kg/m   Estimated body mass index is 31.1 kg/m  as calculated from the following:    Height as of 9/19/22: 1.638 m (5' 4.5\").    Weight as of this encounter: 83.5 kg (184 lb).  Medication Reconciliation: complete          Cintia Barriga LPN  "

## 2023-03-06 NOTE — PROGRESS NOTES
CC: Recheck OB visit at 30w5d    HPI: Raina Javier presents for a routine OB visit now at 30w5d  Concerns: Noting limited fetal movement  Patient denies contractions, vaginal bleeding or leaking of fluid.  She notes continued emotional lability, difficulty getting motivated in AM.    OB History    Para Term  AB Living   1 0 0 0 0 0   SAB IAB Ectopic Multiple Live Births   0 0 0 0 0      # Outcome Date GA Lbr Luis Alberto/2nd Weight Sex Delivery Anes PTL Lv   1 Current              Current Outpatient Medications   Medication     citalopram (CELEXA) 10 MG tablet     Prenatal Vit-Fe Fumarate-FA (PRENATAL MULTIVITAMIN W/IRON) 27-0.8 MG tablet     No current facility-administered medications for this visit.     O: /70   Pulse 96   Wt 83.5 kg (184 lb)   LMP 2022 (Exact Date)   BMI 31.10 kg/m    Body mass index is 31.1 kg/m .  See OB flow sheet  EXAM:  NAD  FH:29 cm  FHT: 145 bpm  Bedside US shows normal fetal activity and AFV, anterior placenta.    No results found for any visits on 23.    A/P: 30w5d gestation    Recheck in 2-3 weeks      Problem List:   Pregnancy risk factors include:    COVID infection at the start of pregnancy- boosted today 2022- growth scan 32 weeks  Selective IgA deficiency- care with transfusion needs to be taken for washed RBC's to prevent anaphylactoid reaction with transfusions.  Hep B S Ag pos- recheck plus Hep B panel, LFT's 23- normal    Dawson Gomez MD FACOG  2:36 PM 3/6/2023

## 2023-03-27 ENCOUNTER — PRENATAL OFFICE VISIT (OUTPATIENT)
Dept: OBGYN | Facility: OTHER | Age: 33
End: 2023-03-27
Attending: OBSTETRICS & GYNECOLOGY
Payer: COMMERCIAL

## 2023-03-27 ENCOUNTER — HOSPITAL ENCOUNTER (OUTPATIENT)
Dept: ULTRASOUND IMAGING | Facility: OTHER | Age: 33
Discharge: HOME | End: 2023-03-27
Attending: OBSTETRICS & GYNECOLOGY
Payer: COMMERCIAL

## 2023-03-27 VITALS
DIASTOLIC BLOOD PRESSURE: 72 MMHG | WEIGHT: 191 LBS | HEART RATE: 80 BPM | SYSTOLIC BLOOD PRESSURE: 130 MMHG | BODY MASS INDEX: 32.28 KG/M2

## 2023-03-27 DIAGNOSIS — U07.1 INFECTION DUE TO 2019 NOVEL CORONAVIRUS: ICD-10-CM

## 2023-03-27 PROCEDURE — 99207 PR OB VISIT-NO CHARGE - GICH ONLY: CPT | Performed by: OBSTETRICS & GYNECOLOGY

## 2023-03-27 PROCEDURE — 76816 OB US FOLLOW-UP PER FETUS: CPT

## 2023-03-27 ASSESSMENT — PAIN SCALES - GENERAL: PAINLEVEL: NO PAIN (0)

## 2023-03-27 NOTE — NURSING NOTE
"Chief Complaint   Patient presents with     Prenatal Care     33 week 5 days   Pt presents to clinic today for prenatal care 33 week 5 days. Pt denies any bleeding, or leakage of fluid at this time. States baby is moving good. Patient denies contractions.     Initial /72   Pulse 80   Wt 86.6 kg (191 lb)   LMP 08/03/2022 (Exact Date)   BMI 32.28 kg/m   Estimated body mass index is 32.28 kg/m  as calculated from the following:    Height as of 9/19/22: 1.638 m (5' 4.5\").    Weight as of this encounter: 86.6 kg (191 lb).  Medication Reconciliation: complete          Cintia Barriga LPN  "

## 2023-03-27 NOTE — PROGRESS NOTES
CC: Recheck OB visit at 33w5d    HPI: Raina Javier presents for a routine OB visit now at 33w5d  Concerns:   Patient notices normal fetal movement, denies contractions, vaginal bleeding or leaking of fluid.    OB History    Para Term  AB Living   1 0 0 0 0 0   SAB IAB Ectopic Multiple Live Births   0 0 0 0 0      # Outcome Date GA Lbr Luis Alberto/2nd Weight Sex Delivery Anes PTL Lv   1 Current              Current Outpatient Medications   Medication     citalopram (CELEXA) 10 MG tablet     Prenatal Vit-Fe Fumarate-FA (PRENATAL MULTIVITAMIN W/IRON) 27-0.8 MG tablet     No current facility-administered medications for this visit.     O: /72   Pulse 80   Wt 86.6 kg (191 lb)   LMP 2022 (Exact Date)   BMI 32.28 kg/m    Body mass index is 32.28 kg/m .  See OB flow sheet  EXAM:  NAD  FH:34 cm  FHT: 150 bpm    Results for orders placed or performed during the hospital encounter of 23   US OB >14 Weeks Follow Up     Status: None    Narrative    OB ULTRASOUND REPORT     Clinical:  Evaluate fetal growth. Prior history of viral infection.  Gestation Number:  1  Presentation:    Cephalic  Lie:    Longitudinal  Cardiac Activity:   Regular  BPM:  163  Movement:  Yes  Placenta:   Anterior; grade 1      Previa:  Not assessed  Adnexa:    Not Visualized  Cervix:       Not assessed  Amniotic Fluid:    Normal  ALYSSA:  17.6 cm    Measurements:    BPD  35 weeks 5 days, 92%  HC  38 weeks 5 days, greater than 98%  AC  37 weeks 2 days, greater than 90%  FL  32 weeks 6 days, 18%  Estimated Fetal Weight  2846 grams  HC/AC  1.01  US age (Current US)  36 weeks 1 day  Gestational Age by LMP  33 weeks 5 days  US EDC (First Study)    US EDC (Current Study)  5/10/2023     2023   %WT for EGA  (Based on First Study)  96 %      Structural Survey:    Head  Unremarkable     Atrium <10 mm  Unremarkable  Face  Unremarkable  Nose/Lips  Unremarkable  Profile  Unremarkable  Spine  Unremarkable  Stomach   Unremarkable  Kidneys  Unremarkable  Bladder  Unremarkable    3 Vessel Cord  Unremarkable      Impression    IMPRESSION:  Single living intrauterine pregnancy demonstrates satisfactory  interval growth. No gross anatomic abnormality, no evidence of other  concerning finding at this time.       Based on the current measurements, the estimated fetal weight is 2846  g which is at the 96 percentile.    ELEANOR CORTES MD         SYSTEM ID:  [id number]       A/P: 33w5d    Recheck in 1-2 weeks    Problem List:   Pregnancy risk factors include:    COVID infection at the start of pregnancy- boosted today 2022- growth scan 32 weeks  Selective IgA deficiency- care with transfusion needs to be taken for washed RBC's to prevent anaphylactoid reaction with transfusions.  Hep B S Ag pos- recheck plus Hep B panel, LFT's 23- normal, Neg Hep B DNA    Dawson Gomez MD FACOG  3:31 PM 3/27/2023

## 2023-04-10 ENCOUNTER — PRENATAL OFFICE VISIT (OUTPATIENT)
Dept: OBGYN | Facility: OTHER | Age: 33
End: 2023-04-10
Attending: OBSTETRICS & GYNECOLOGY
Payer: COMMERCIAL

## 2023-04-10 VITALS
BODY MASS INDEX: 32.28 KG/M2 | HEART RATE: 85 BPM | DIASTOLIC BLOOD PRESSURE: 78 MMHG | SYSTOLIC BLOOD PRESSURE: 138 MMHG | WEIGHT: 191 LBS

## 2023-04-10 DIAGNOSIS — Z34.83 ENCOUNTER FOR SUPERVISION OF OTHER NORMAL PREGNANCY IN THIRD TRIMESTER: Primary | ICD-10-CM

## 2023-04-10 PROCEDURE — 87653 STREP B DNA AMP PROBE: CPT | Mod: ZL | Performed by: OBSTETRICS & GYNECOLOGY

## 2023-04-10 PROCEDURE — 99207 PR OB VISIT-NO CHARGE - GICH ONLY: CPT | Performed by: OBSTETRICS & GYNECOLOGY

## 2023-04-10 ASSESSMENT — PAIN SCALES - GENERAL: PAINLEVEL: NO PAIN (0)

## 2023-04-10 NOTE — NURSING NOTE
"Chief Complaint   Patient presents with     Prenatal Care     35 week 5 days     Pt presents to clinic today for prenatal care 35 w 5 days. Pt denies any bleeding, or leakage of fluid at this time. States baby is moving good. Patient denies contractions.   Initial /78   Pulse 85   Wt 86.6 kg (191 lb)   LMP 08/03/2022 (Exact Date)   BMI 32.28 kg/m   Estimated body mass index is 32.28 kg/m  as calculated from the following:    Height as of 9/19/22: 1.638 m (5' 4.5\").    Weight as of this encounter: 86.6 kg (191 lb).  Medication Reconciliation: complete        Cintia Barriga LPN  "

## 2023-04-10 NOTE — PROGRESS NOTES
CC: Recheck OB visit at 35w5d    HPI: Raina Javier presents for a routine OB visit now at 35w5d  Concerns: No  Patient notices normal fetal movement, denies contractions, vaginal bleeding or leaking of fluid.    OB History    Para Term  AB Living   1 0 0 0 0 0   SAB IAB Ectopic Multiple Live Births   0 0 0 0 0      # Outcome Date GA Lbr Luis Alberto/2nd Weight Sex Delivery Anes PTL Lv   1 Current              Current Outpatient Medications   Medication     citalopram (CELEXA) 10 MG tablet     Prenatal Vit-Fe Fumarate-FA (PRENATAL MULTIVITAMIN W/IRON) 27-0.8 MG tablet     No current facility-administered medications for this visit.       O: /78   Pulse 85   Wt 86.6 kg (191 lb)   LMP 2022 (Exact Date)   BMI 32.28 kg/m    Body mass index is 32.28 kg/m .  See OB flow sheet  EXAM:  NAD  FH:32 cm  FHT: 125 bpm  GBS collected  Cx FT externally, closed and long internally.    No results found for any visits on 04/10/23.    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  Per CDC guidance her Hep B S Ag and Ab pos status if false positive.    Dawson Gomez MD FACOG  9:01 AM 4/10/2023

## 2023-04-11 LAB — GP B STREP DNA SPEC QL NAA+PROBE: POSITIVE

## 2023-04-17 ENCOUNTER — PRENATAL OFFICE VISIT (OUTPATIENT)
Dept: OBGYN | Facility: OTHER | Age: 33
End: 2023-04-17
Attending: OBSTETRICS & GYNECOLOGY
Payer: COMMERCIAL

## 2023-04-17 VITALS
HEART RATE: 87 BPM | SYSTOLIC BLOOD PRESSURE: 130 MMHG | WEIGHT: 193 LBS | BODY MASS INDEX: 32.62 KG/M2 | DIASTOLIC BLOOD PRESSURE: 78 MMHG

## 2023-04-17 DIAGNOSIS — Z34.90 NORMAL PREGNANCY, ANTEPARTUM: Primary | ICD-10-CM

## 2023-04-17 PROCEDURE — 99207 PR OB VISIT-NO CHARGE - GICH ONLY: CPT | Performed by: OBSTETRICS & GYNECOLOGY

## 2023-04-17 ASSESSMENT — PAIN SCALES - GENERAL: PAINLEVEL: NO PAIN (0)

## 2023-04-17 NOTE — PROGRESS NOTES
CC: Recheck OB visit at 36w5d    HPI: Raina Javier presents for a routine OB visit now at 36w5d  Concerns: No  Patient notices normal fetal movement, denies contractions, vaginal bleeding or leaking of fluid.    OB History    Para Term  AB Living   1 0 0 0 0 0   SAB IAB Ectopic Multiple Live Births   0 0 0 0 0      # Outcome Date GA Lbr Luis Alberto/2nd Weight Sex Delivery Anes PTL Lv   1 Current              Current Outpatient Medications   Medication     citalopram (CELEXA) 10 MG tablet     Prenatal Vit-Fe Fumarate-FA (PRENATAL MULTIVITAMIN W/IRON) 27-0.8 MG tablet     No current facility-administered medications for this visit.     O: /78   Pulse 87   Wt 87.5 kg (193 lb)   LMP 2022 (Exact Date)   BMI 32.62 kg/m    Body mass index is 32.62 kg/m .  See OB flow sheet  EXAM:  NAD  FHT:126 bpm    No results found for any visits on 23.    A/P: 36w5d    Recheck in 1 weeks  Consider induction at 39 weeks due to need for washed RBC's if transfusion needed.  Will need to have washed PRBC's sent from [place] on day of induction or with labor due to sIGA deficiency.    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  Per CDC guidance her Hep B S Ag and Ab pos status if false positive.      Dawson Gomez MD FACOG  8:52 AM 2023

## 2023-04-17 NOTE — NURSING NOTE
"Chief Complaint   Patient presents with     Prenatal Care     36 w 5 d   Pt presents to clinic today for prenatal care 36 w 5 d. Pt denies any bleeding, or leakage of fluid at this time. States baby is moving good. Patient denies contractions.     Initial /78   Pulse 87   Wt 87.5 kg (193 lb)   LMP 08/03/2022 (Exact Date)   BMI 32.62 kg/m   Estimated body mass index is 32.62 kg/m  as calculated from the following:    Height as of 9/19/22: 1.638 m (5' 4.5\").    Weight as of this encounter: 87.5 kg (193 lb).  Medication Reconciliation: complete          Cintia Barriga LPN  "

## 2023-04-24 ENCOUNTER — PRENATAL OFFICE VISIT (OUTPATIENT)
Dept: OBGYN | Facility: OTHER | Age: 33
End: 2023-04-24
Attending: OBSTETRICS & GYNECOLOGY
Payer: COMMERCIAL

## 2023-04-24 ENCOUNTER — HOSPITAL ENCOUNTER (OUTPATIENT)
Dept: ULTRASOUND IMAGING | Facility: OTHER | Age: 33
Discharge: HOME | End: 2023-04-24
Attending: OBSTETRICS & GYNECOLOGY
Payer: COMMERCIAL

## 2023-04-24 VITALS
WEIGHT: 193.4 LBS | OXYGEN SATURATION: 97 % | DIASTOLIC BLOOD PRESSURE: 78 MMHG | BODY MASS INDEX: 32.68 KG/M2 | SYSTOLIC BLOOD PRESSURE: 130 MMHG | HEART RATE: 86 BPM

## 2023-04-24 DIAGNOSIS — D80.2 IGA DEFICIENCY, SELECTIVE (H): Primary | ICD-10-CM

## 2023-04-24 PROCEDURE — 76816 OB US FOLLOW-UP PER FETUS: CPT

## 2023-04-24 PROCEDURE — 99207 PR OB VISIT-NO CHARGE - GICH ONLY: CPT | Performed by: OBSTETRICS & GYNECOLOGY

## 2023-04-24 ASSESSMENT — PAIN SCALES - GENERAL: PAINLEVEL: NO PAIN (0)

## 2023-04-24 NOTE — NURSING NOTE
Patient presents to clinic for OB visit patient is 37 week 5 days  No concerns   /78   Pulse 86   Wt 87.7 kg (193 lb 6.4 oz)   LMP 08/03/2022 (Exact Date)   SpO2 97%   BMI 32.68 kg/m    Glory Flores LPN on 4/24/2023 at 8:56 AM

## 2023-04-24 NOTE — PROGRESS NOTES
CC: Recheck OB visit at 37w5d    HPI: Raina Javier presents for a routine OB visit now at 37w5d  Concerns: No  Patient notices normal fetal movement, denies contractions, vaginal bleeding or leaking of fluid.    OB History    Para Term  AB Living   1 0 0 0 0 0   SAB IAB Ectopic Multiple Live Births   0 0 0 0 0      # Outcome Date GA Lbr Luis Alberto/2nd Weight Sex Delivery Anes PTL Lv   1 Current              Current Outpatient Medications   Medication     citalopram (CELEXA) 10 MG tablet     Prenatal Vit-Fe Fumarate-FA (PRENATAL MULTIVITAMIN W/IRON) 27-0.8 MG tablet     No current facility-administered medications for this visit.     O: /78   Pulse 86   Wt 87.7 kg (193 lb 6.4 oz)   LMP 2022 (Exact Date)   SpO2 97%   BMI 32.68 kg/m    Body mass index is 32.68 kg/m .  See OB flow sheet  EXAM:  NAD    FHT:135 bpm    Results for orders placed or performed during the hospital encounter of 23   US OB >14 Weeks Follow Up     Status: None    Narrative    OB ULTRASOUND - Transabdominal     Clinical:  Macrosomic baby.   Gestation:  1  Comparison: 3/27/2023  Presentation: Cephalic  Cardiac Activity:  Regular at 127 bpm  Movement:  Yes  Placenta: Anterior rdGrdrrdarddrderd:rd rd3rd Amniotic Fluid: Normal ALYSSA: 11.5 cm    Measurements ([place]):  BPD: 38 weeks 1 day, 81%  HC: 39 weeks 6 days, 78%  AC: 40 weeks 2 days, >98%  FL: 35 weeks 3 days, 6%    Estimated Fetal Weight:  3622 grams  HC/AC:  0.95  US age (current):  38 weeks 3 days  Gestational age:  37 weeks 5 days  US EDC (preferred):  5/10/2023   %WT for EGA (preferred dating):  86 %    Structural Survey:  Stomach:  Unremarkable  Kidneys:  Unremarkable  Bladder:  Unremarkable  4CH:  Unremarkable      Impression    IMPRESSION: Single living intrauterine pregnancy with an estimated  fetal weight of 3622 g, which is at the 86th percentile for  gestational age. The abdominal circumference measures greater than the  98th percentile.    [facility],  MD         SYSTEM ID:  [id number]       A/P: 37w5d gestation    Recheck in 1 week  Planning on induction at 39 weeks due to need for sIGA appropriate RBC prep in case of transfusion need.    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  Per CDC guidance her Hep B S Ag and Ab pos status if false positive.    Dawson Gomez MD FACOG  9:09 AM 2023

## 2023-05-01 ENCOUNTER — PRENATAL OFFICE VISIT (OUTPATIENT)
Dept: OBGYN | Facility: OTHER | Age: 33
End: 2023-05-01
Attending: OBSTETRICS & GYNECOLOGY
Payer: COMMERCIAL

## 2023-05-01 VITALS
HEART RATE: 98 BPM | BODY MASS INDEX: 33.12 KG/M2 | WEIGHT: 196 LBS | SYSTOLIC BLOOD PRESSURE: 122 MMHG | DIASTOLIC BLOOD PRESSURE: 72 MMHG

## 2023-05-01 DIAGNOSIS — D80.2 IGA DEFICIENCY, SELECTIVE (H): Primary | ICD-10-CM

## 2023-05-01 DIAGNOSIS — O36.8130 DECREASED FETAL MOVEMENTS IN THIRD TRIMESTER, SINGLE OR UNSPECIFIED FETUS: ICD-10-CM

## 2023-05-01 DIAGNOSIS — B18.1 HEPATITIS B, CHRONIC (H): ICD-10-CM

## 2023-05-01 PROCEDURE — 59025 FETAL NON-STRESS TEST: CPT | Performed by: OBSTETRICS & GYNECOLOGY

## 2023-05-01 PROCEDURE — 99207 PR OB VISIT-NO CHARGE - GICH ONLY: CPT | Performed by: OBSTETRICS & GYNECOLOGY

## 2023-05-01 ASSESSMENT — PAIN SCALES - GENERAL: PAINLEVEL: NO PAIN (0)

## 2023-05-01 NOTE — PROGRESS NOTES
CC: Recheck OB visit at 38w5d    HPI: Raina Javeir presents for a routine OB visit now at 38w5d  Concerns: Having a hard time distinguishing baby movements  Patient notices normal fetal movement, denies contractions, vaginal bleeding or leaking of fluid.    OB History    Para Term  AB Living   1 0 0 0 0 0   SAB IAB Ectopic Multiple Live Births   0 0 0 0 0      # Outcome Date GA Lbr Luis Alberto/2nd Weight Sex Delivery Anes PTL Lv   1 Current              Current Outpatient Medications   Medication     citalopram (CELEXA) 10 MG tablet     Prenatal Vit-Fe Fumarate-FA (PRENATAL MULTIVITAMIN W/IRON) 27-0.8 MG tablet     No current facility-administered medications for this visit.     O: /72   Pulse 98   Wt 88.9 kg (196 lb)   LMP 2022 (Exact Date)   BMI 33.12 kg/m    Body mass index is 33.12 kg/m .  See OB flow sheet  EXAM:  NAD  Size = dates  FHT: 130 bpm      NST doucmentation:    Indication: No diagnosis found.  Duration: 30 min     38w5d  FHR baseline: 125 with moderate variability  Accelerations: y  Decelerations: n  Contractions: n    Category 1      Cx FT, posterior, high, cephalic ostensibly    No results found for any visits on 23.    A/P: 38w5d    Discussed risks, benefits and alternatives of induction of labor, and process for getting washed RBC's available for her sIGA deficiency. Will recheck next Tuesday and intend ripening and induction for Thursday evening into Friday depending again on her exam. Reviewed labor pain management. Risks of  with and without induction. Risks of bleeding and infection with .  Increased risks of need for transfusion with , and challenges getting RBC's and limitations on timeframe for usage with her condition.    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  Per CDC guidance her Hep B S Ag and Ab pos status if false positive.      Dawson Gomez MD FACOG  8:46 AM 5/1/2023

## 2023-05-01 NOTE — NURSING NOTE
"Chief Complaint   Patient presents with     Prenatal Care     38 w 5 days     Pt presents to clinic today for prenatal care 38 w 5 days. Pt denies any bleeding, or leakage of fluid at this time. States baby is moving good. Patient denies contractions.   Initial /72   Pulse 98   Wt 88.9 kg (196 lb)   LMP 08/03/2022 (Exact Date)   BMI 33.12 kg/m   Estimated body mass index is 33.12 kg/m  as calculated from the following:    Height as of 9/19/22: 1.638 m (5' 4.5\").    Weight as of this encounter: 88.9 kg (196 lb).  Medication Reconciliation: complete          Cintia Barriga LPN  "

## 2023-05-09 ENCOUNTER — PRENATAL OFFICE VISIT (OUTPATIENT)
Dept: OBGYN | Facility: OTHER | Age: 33
End: 2023-05-09
Attending: OBSTETRICS & GYNECOLOGY
Payer: COMMERCIAL

## 2023-05-09 VITALS
WEIGHT: 196 LBS | BODY MASS INDEX: 33.12 KG/M2 | SYSTOLIC BLOOD PRESSURE: 140 MMHG | HEART RATE: 95 BPM | DIASTOLIC BLOOD PRESSURE: 80 MMHG

## 2023-05-09 DIAGNOSIS — D80.2 IGA DEFICIENCY, SELECTIVE (H): Primary | ICD-10-CM

## 2023-05-09 DIAGNOSIS — Z34.90 NORMAL PREGNANCY, ANTEPARTUM: ICD-10-CM

## 2023-05-09 PROCEDURE — 99207 PR OB VISIT-NO CHARGE - GICH ONLY: CPT | Performed by: OBSTETRICS & GYNECOLOGY

## 2023-05-09 NOTE — PROGRESS NOTES
CC: Recheck OB visit at 39w6d    HPI: Raina Javier presents for a routine OB visit now at 39w6d  Concerns: No  Patient notices normal fetal movement, denies contractions, vaginal bleeding or leaking of fluid.    OB History    Para Term  AB Living   1 0 0 0 0 0   SAB IAB Ectopic Multiple Live Births   0 0 0 0 0      # Outcome Date GA Lbr Luis Alberto/2nd Weight Sex Delivery Anes PTL Lv   1 Current              Current Outpatient Medications   Medication     citalopram (CELEXA) 10 MG tablet     Prenatal Vit-Fe Fumarate-FA (PRENATAL MULTIVITAMIN W/IRON) 27-0.8 MG tablet     No current facility-administered medications for this visit.     O: BP (!) 140/80   Pulse 95   Wt 88.9 kg (196 lb)   LMP 2022 (Exact Date)   BMI 33.12 kg/m    Body mass index is 33.12 kg/m .  See OB flow sheet  EXAM:  NAD  Size = Dates EFW 3800 g by Leopold's  Bedside US shows normal AFV, FHR and movement  Cx FT, posterior, 50, -2, cephalic    No results found for any visits on 23.    A/P: 39w6d gestation  Ripening on Friday evening with plan for Saturday induction.  Will send for crossmatched and washed RBC's to be available for early postpartum period.    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  Per CDC guidance her Hep B S Ag and Ab pos status if false positive.    Dawson Gomez MD FACOG  9:07 AM 2023

## 2023-05-09 NOTE — NURSING NOTE
"Chief Complaint   Patient presents with     Prenatal Care     39 w 6 day     Pt presents to clinic today for prenatal care 39 w 4 day. Pt denies any bleeding, or leakage of fluid at this time. States baby is moving good. Patient denies contractions.   Initial BP (!) 140/80   Pulse 95   Wt 88.9 kg (196 lb)   LMP 08/03/2022 (Exact Date)   BMI 33.12 kg/m   Estimated body mass index is 33.12 kg/m  as calculated from the following:    Height as of 9/19/22: 1.638 m (5' 4.5\").    Weight as of this encounter: 88.9 kg (196 lb).  Medication Reconciliation: complete          Cintia Barriga LPN  "

## 2023-05-12 ENCOUNTER — HOSPITAL ENCOUNTER (INPATIENT)
Facility: OTHER | Age: 33
LOS: 2 days | Discharge: HOME | End: 2023-05-14
Attending: OBSTETRICS & GYNECOLOGY | Admitting: OBSTETRICS & GYNECOLOGY
Payer: COMMERCIAL

## 2023-05-12 DIAGNOSIS — F43.22 ADJUSTMENT DISORDER WITH ANXIOUS MOOD: ICD-10-CM

## 2023-05-12 DIAGNOSIS — O33.3XX0 CEPHALOPELVIC DISPROPORTION DUE TO OUTLET CONTRACTION OF PELVIS, SINGLE OR UNSPECIFIED FETUS: ICD-10-CM

## 2023-05-12 DIAGNOSIS — Z98.891 S/P CESAREAN SECTION: ICD-10-CM

## 2023-05-12 DIAGNOSIS — Z37.9 NORMAL LABOR: Primary | ICD-10-CM

## 2023-05-12 LAB
ABO/RH(D): NORMAL
ALBUMIN MFR UR ELPH: 42.5 MG/DL (ref 1–14)
ALBUMIN SERPL BCG-MCNC: 3.5 G/DL (ref 3.5–5.2)
ALP SERPL-CCNC: 194 U/L (ref 35–104)
ALT SERPL W P-5'-P-CCNC: 19 U/L (ref 10–35)
ANION GAP SERPL CALCULATED.3IONS-SCNC: 11 MMOL/L (ref 7–15)
ANTIBODY SCREEN: NEGATIVE
AST SERPL W P-5'-P-CCNC: 26 U/L (ref 10–35)
BILIRUB SERPL-MCNC: <0.2 MG/DL
BUN SERPL-MCNC: 11.1 MG/DL (ref 6–20)
CALCIUM SERPL-MCNC: 9.5 MG/DL (ref 8.6–10)
CHLORIDE SERPL-SCNC: 104 MMOL/L (ref 98–107)
CREAT SERPL-MCNC: 0.97 MG/DL (ref 0.51–0.95)
CREAT UR-MCNC: 58.3 MG/DL
DEPRECATED HCO3 PLAS-SCNC: 18 MMOL/L (ref 22–29)
ERYTHROCYTE [DISTWIDTH] IN BLOOD BY AUTOMATED COUNT: 12.2 % (ref 10–15)
GFR SERPL CREATININE-BSD FRML MDRD: 80 ML/MIN/1.73M2
GLUCOSE SERPL-MCNC: 83 MG/DL (ref 70–99)
HCT VFR BLD AUTO: 34.1 % (ref 35–47)
HGB BLD-MCNC: 11.9 G/DL (ref 11.7–15.7)
HOLD SPECIMEN: NORMAL
MCH RBC QN AUTO: 30.5 PG (ref 26.5–33)
MCHC RBC AUTO-ENTMCNC: 34.9 G/DL (ref 31.5–36.5)
MCV RBC AUTO: 87 FL (ref 78–100)
PLATELET # BLD AUTO: 149 10E3/UL (ref 150–450)
POTASSIUM SERPL-SCNC: 4.3 MMOL/L (ref 3.4–5.3)
PROT SERPL-MCNC: 6.1 G/DL (ref 6.4–8.3)
PROT/CREAT 24H UR: 0.73 MG/MG CR (ref 0–0.2)
RBC # BLD AUTO: 3.9 10E6/UL (ref 3.8–5.2)
SODIUM SERPL-SCNC: 133 MMOL/L (ref 136–145)
SPECIMEN EXPIRATION DATE: NORMAL
WBC # BLD AUTO: 8.5 10E3/UL (ref 4–11)

## 2023-05-12 PROCEDURE — 86850 RBC ANTIBODY SCREEN: CPT | Performed by: OBSTETRICS & GYNECOLOGY

## 2023-05-12 PROCEDURE — 86923 COMPATIBILITY TEST ELECTRIC: CPT | Performed by: OBSTETRICS & GYNECOLOGY

## 2023-05-12 PROCEDURE — 120N000001 HC R&B MED SURG/OB

## 2023-05-12 PROCEDURE — 86780 TREPONEMA PALLIDUM: CPT | Performed by: OBSTETRICS & GYNECOLOGY

## 2023-05-12 PROCEDURE — 85027 COMPLETE CBC AUTOMATED: CPT | Performed by: OBSTETRICS & GYNECOLOGY

## 2023-05-12 PROCEDURE — 250N000013 HC RX MED GY IP 250 OP 250 PS 637: Performed by: OBSTETRICS & GYNECOLOGY

## 2023-05-12 PROCEDURE — 3E0P7VZ INTRODUCTION OF HORMONE INTO FEMALE REPRODUCTIVE, VIA NATURAL OR ARTIFICIAL OPENING: ICD-10-PCS | Performed by: OBSTETRICS & GYNECOLOGY

## 2023-05-12 PROCEDURE — 80053 COMPREHEN METABOLIC PANEL: CPT | Performed by: OBSTETRICS & GYNECOLOGY

## 2023-05-12 PROCEDURE — 36415 COLL VENOUS BLD VENIPUNCTURE: CPT | Performed by: OBSTETRICS & GYNECOLOGY

## 2023-05-12 PROCEDURE — 84156 ASSAY OF PROTEIN URINE: CPT | Performed by: OBSTETRICS & GYNECOLOGY

## 2023-05-12 RX ORDER — OXYTOCIN/0.9 % SODIUM CHLORIDE 30/500 ML
100-340 PLASTIC BAG, INJECTION (ML) INTRAVENOUS CONTINUOUS PRN
Status: DISCONTINUED | OUTPATIENT
Start: 2023-05-12 | End: 2023-05-14

## 2023-05-12 RX ORDER — TRANEXAMIC ACID 10 MG/ML
1 INJECTION, SOLUTION INTRAVENOUS EVERY 30 MIN PRN
Status: DISCONTINUED | OUTPATIENT
Start: 2023-05-12 | End: 2023-05-14 | Stop reason: HOSPADM

## 2023-05-12 RX ORDER — ONDANSETRON 2 MG/ML
4 INJECTION INTRAMUSCULAR; INTRAVENOUS EVERY 6 HOURS PRN
Status: DISCONTINUED | OUTPATIENT
Start: 2023-05-12 | End: 2023-05-14 | Stop reason: HOSPADM

## 2023-05-12 RX ORDER — OXYTOCIN 10 [USP'U]/ML
10 INJECTION, SOLUTION INTRAMUSCULAR; INTRAVENOUS
Status: DISCONTINUED | OUTPATIENT
Start: 2023-05-12 | End: 2023-05-14 | Stop reason: HOSPADM

## 2023-05-12 RX ORDER — CITRIC ACID/SODIUM CITRATE 334-500MG
30 SOLUTION, ORAL ORAL
Status: COMPLETED | OUTPATIENT
Start: 2023-05-12 | End: 2023-05-13

## 2023-05-12 RX ORDER — HYDROXYZINE HYDROCHLORIDE 25 MG/1
50 TABLET, FILM COATED ORAL
Status: DISCONTINUED | OUTPATIENT
Start: 2023-05-12 | End: 2023-05-14 | Stop reason: HOSPADM

## 2023-05-12 RX ORDER — IBUPROFEN 400 MG/1
800 TABLET, FILM COATED ORAL
Status: DISCONTINUED | OUTPATIENT
Start: 2023-05-12 | End: 2023-05-14

## 2023-05-12 RX ORDER — METOCLOPRAMIDE HYDROCHLORIDE 5 MG/ML
10 INJECTION INTRAMUSCULAR; INTRAVENOUS EVERY 6 HOURS PRN
Status: DISCONTINUED | OUTPATIENT
Start: 2023-05-12 | End: 2023-05-14 | Stop reason: HOSPADM

## 2023-05-12 RX ORDER — METOCLOPRAMIDE 10 MG/1
10 TABLET ORAL EVERY 6 HOURS PRN
Status: DISCONTINUED | OUTPATIENT
Start: 2023-05-12 | End: 2023-05-14 | Stop reason: HOSPADM

## 2023-05-12 RX ORDER — FENTANYL CITRATE 50 UG/ML
100 INJECTION, SOLUTION INTRAMUSCULAR; INTRAVENOUS
Status: DISCONTINUED | OUTPATIENT
Start: 2023-05-12 | End: 2023-05-14 | Stop reason: HOSPADM

## 2023-05-12 RX ORDER — NALOXONE HYDROCHLORIDE 0.4 MG/ML
0.2 INJECTION, SOLUTION INTRAMUSCULAR; INTRAVENOUS; SUBCUTANEOUS
Status: DISCONTINUED | OUTPATIENT
Start: 2023-05-12 | End: 2023-05-14 | Stop reason: HOSPADM

## 2023-05-12 RX ORDER — NIFEDIPINE 10 MG/1
10-20 CAPSULE ORAL
Status: DISCONTINUED | OUTPATIENT
Start: 2023-05-12 | End: 2023-05-14

## 2023-05-12 RX ORDER — PENICILLIN G 3000000 [IU]/50ML
3 INJECTION, SOLUTION INTRAVENOUS EVERY 4 HOURS
Status: DISCONTINUED | OUTPATIENT
Start: 2023-05-12 | End: 2023-05-13

## 2023-05-12 RX ORDER — METHYLERGONOVINE MALEATE 0.2 MG/ML
200 INJECTION INTRAVENOUS
Status: DISCONTINUED | OUTPATIENT
Start: 2023-05-12 | End: 2023-05-14 | Stop reason: HOSPADM

## 2023-05-12 RX ORDER — LABETALOL HYDROCHLORIDE 5 MG/ML
20 INJECTION, SOLUTION INTRAVENOUS
Status: DISCONTINUED | OUTPATIENT
Start: 2023-05-12 | End: 2023-05-14

## 2023-05-12 RX ORDER — ONDANSETRON 4 MG/1
4 TABLET, ORALLY DISINTEGRATING ORAL EVERY 6 HOURS PRN
Status: DISCONTINUED | OUTPATIENT
Start: 2023-05-12 | End: 2023-05-14 | Stop reason: HOSPADM

## 2023-05-12 RX ORDER — MISOPROSTOL 100 UG/1
400 TABLET ORAL
Status: DISCONTINUED | OUTPATIENT
Start: 2023-05-12 | End: 2023-05-14 | Stop reason: HOSPADM

## 2023-05-12 RX ORDER — OXYTOCIN 10 [USP'U]/ML
10 INJECTION, SOLUTION INTRAMUSCULAR; INTRAVENOUS
Status: DISCONTINUED | OUTPATIENT
Start: 2023-05-12 | End: 2023-05-14

## 2023-05-12 RX ORDER — SODIUM CHLORIDE, SODIUM LACTATE, POTASSIUM CHLORIDE, CALCIUM CHLORIDE 600; 310; 30; 20 MG/100ML; MG/100ML; MG/100ML; MG/100ML
INJECTION, SOLUTION INTRAVENOUS CONTINUOUS
Status: DISCONTINUED | OUTPATIENT
Start: 2023-05-12 | End: 2023-05-14 | Stop reason: HOSPADM

## 2023-05-12 RX ORDER — KETOROLAC TROMETHAMINE 30 MG/ML
30 INJECTION, SOLUTION INTRAMUSCULAR; INTRAVENOUS
Status: DISCONTINUED | OUTPATIENT
Start: 2023-05-12 | End: 2023-05-14

## 2023-05-12 RX ORDER — ACETAMINOPHEN 325 MG/1
650 TABLET ORAL EVERY 4 HOURS PRN
Status: DISCONTINUED | OUTPATIENT
Start: 2023-05-12 | End: 2023-05-14 | Stop reason: HOSPADM

## 2023-05-12 RX ORDER — OXYTOCIN/0.9 % SODIUM CHLORIDE 30/500 ML
340 PLASTIC BAG, INJECTION (ML) INTRAVENOUS CONTINUOUS PRN
Status: DISCONTINUED | OUTPATIENT
Start: 2023-05-12 | End: 2023-05-14 | Stop reason: HOSPADM

## 2023-05-12 RX ORDER — PROCHLORPERAZINE MALEATE 10 MG
10 TABLET ORAL EVERY 6 HOURS PRN
Status: DISCONTINUED | OUTPATIENT
Start: 2023-05-12 | End: 2023-05-14 | Stop reason: HOSPADM

## 2023-05-12 RX ORDER — PRENATAL VIT/IRON FUM/FOLIC AC 27MG-0.8MG
1 TABLET ORAL DAILY
Status: DISCONTINUED | OUTPATIENT
Start: 2023-05-12 | End: 2023-05-14

## 2023-05-12 RX ORDER — LIDOCAINE 40 MG/G
CREAM TOPICAL
Status: DISCONTINUED | OUTPATIENT
Start: 2023-05-12 | End: 2023-05-14 | Stop reason: HOSPADM

## 2023-05-12 RX ORDER — PROCHLORPERAZINE 25 MG
25 SUPPOSITORY, RECTAL RECTAL EVERY 12 HOURS PRN
Status: DISCONTINUED | OUTPATIENT
Start: 2023-05-12 | End: 2023-05-14 | Stop reason: HOSPADM

## 2023-05-12 RX ORDER — CARBOPROST TROMETHAMINE 250 UG/ML
250 INJECTION, SOLUTION INTRAMUSCULAR
Status: DISCONTINUED | OUTPATIENT
Start: 2023-05-12 | End: 2023-05-14 | Stop reason: HOSPADM

## 2023-05-12 RX ORDER — CITALOPRAM HYDROBROMIDE 20 MG/1
20 TABLET ORAL DAILY
Status: DISCONTINUED | OUTPATIENT
Start: 2023-05-12 | End: 2023-05-14

## 2023-05-12 RX ORDER — NALOXONE HYDROCHLORIDE 0.4 MG/ML
0.4 INJECTION, SOLUTION INTRAMUSCULAR; INTRAVENOUS; SUBCUTANEOUS
Status: DISCONTINUED | OUTPATIENT
Start: 2023-05-12 | End: 2023-05-14 | Stop reason: HOSPADM

## 2023-05-12 RX ORDER — MISOPROSTOL 100 UG/1
25 TABLET ORAL EVERY 4 HOURS PRN
Status: DISCONTINUED | OUTPATIENT
Start: 2023-05-12 | End: 2023-05-14 | Stop reason: HOSPADM

## 2023-05-12 RX ADMIN — PRENATAL VIT W/ FE FUMARATE-FA TAB 27-0.8 MG 1 TABLET: 27-0.8 TAB at 22:10

## 2023-05-12 RX ADMIN — MISOPROSTOL 25 MCG: 100 TABLET ORAL at 18:10

## 2023-05-12 RX ADMIN — CITALOPRAM HYDROBROMIDE 20 MG: 20 TABLET ORAL at 22:10

## 2023-05-12 RX ADMIN — MISOPROSTOL 25 MCG: 100 TABLET ORAL at 22:13

## 2023-05-12 ASSESSMENT — ACTIVITIES OF DAILY LIVING (ADL)
FALL_HISTORY_WITHIN_LAST_SIX_MONTHS: NO
WEAR_GLASSES_OR_BLIND: YES
DRESSING/BATHING_DIFFICULTY: NO
CONCENTRATING,_REMEMBERING_OR_MAKING_DECISIONS_DIFFICULTY: NO
DIFFICULTY_EATING/SWALLOWING: NO
TOILETING_ISSUES: NO
ADLS_ACUITY_SCORE: 31
VISION_MANAGEMENT: CONTACTS
DOING_ERRANDS_INDEPENDENTLY_DIFFICULTY: NO
ADLS_ACUITY_SCORE: 20
ADLS_ACUITY_SCORE: 20
WALKING_OR_CLIMBING_STAIRS_DIFFICULTY: NO
CHANGE_IN_FUNCTIONAL_STATUS_SINCE_ONSET_OF_CURRENT_ILLNESS/INJURY: NO

## 2023-05-12 NOTE — H&P
[facility]    History and Physical  Obstetrics and Gynecology     Date of Admission:  2023    Assessment & Plan   Raina Javier is a 33 year old female who presents with term pregnancy for induction of labor  ASSESSMENT:   IUP @ 40w2d for induction of labor.  Indication post term with selective IgA deficiency.  NST reactive.  Category  I    PLAN:   Admit - see IP orders    Dawson Gomez MD    History of Present Illness   Raina Javier is a 33 year old female  40w2d  Estimated Date of Delivery: May 10, 2023 is calculated from Patient's last menstrual period was 2022 (exact date). [facility].  Indication postterm with selective IGA deficiency.    PRENATAL COURSE  Prenatal course was complicated by history of selective IgA deficiency. . False pos testing for Hep B which was vetted through [facility] and [facility] recs.    Recent Labs   Lab Test 10/13/22  0735   AS Negative     Rhogam not indicated   Recent Labs   Lab Test 22  1624 10/13/22  0735   HEPBANG Reactive* Reactive*   HIAGAB  --  Nonreactive   RUQIGG  --  Positive       Past Medical History    I have reviewed this patient's medical history and updated it with pertinent information if needed.   Past Medical History:   Diagnosis Date     Acid reflux      Anxiety      Depression      Depressive disorder      Fructose intolerance      IgA deficiency (H)     Diagnosed in . NO blood trasnfusions with IgA- medical alert     Kidney stone      Uncomplicated asthma        Past Surgical History   I have reviewed this patient's surgical history and updated it with pertinent information if needed.  Past Surgical History:   Procedure Laterality Date     SINUS SURGERY         Prior to Admission Medications   Prior to Admission Medications   Prescriptions Last Dose Informant Patient Reported? Taking?   Prenatal Vit-Fe Fumarate-FA (PRENATAL MULTIVITAMIN  W/IRON) 27-0.8 MG tablet 5/11/2023  Yes Yes   Sig: Take 1 tablet by mouth daily   citalopram (CELEXA) 10 MG tablet 5/11/2023  No Yes   Sig: Take 2 tablets (20 mg) by mouth daily      Facility-Administered Medications: None     Allergies   Allergies   Allergen Reactions     Gentamycin [Gentamicin Sulfate]      Eye drops, swelling of the eyes and redness     Loracarbef Rash       Social History   I have reviewed this patient's social history and updated it with pertinent information if needed. Raina BURNS Concepcion  reports that she has never smoked. She has never used smokeless tobacco. She reports that she does not currently use alcohol. She reports that she does not use drugs.    Family History   I have reviewed this patient's family history and updated it with pertinent information if needed.   Family History   Problem Relation Age of Onset     Skin Cancer Mother      Hashimoto's thyroiditis Mother      Autoimmune Disease Father      Hypertension Father      Skin Cancer Maternal Grandfather      Diabetes Sister         Type 1     Hashimoto's thyroiditis Sister        Immunization History   Immunizations are up to date    Physical Exam                      Vital Signs with Ranges       Abdomen: gravid, single vertex fetus, non-tender, EFW 8 lbs 8  Cervical Exam: 1/ 40/ Posterior/ soft/ -2     Fetal Heart Tones: 140 baseline, moderate variablility, + accels, no decels and Category I  TOCO: None    Constitutional: healthy, alert, active and no distress   Respiratory: No increased work of breathing, good air exchange, clear to auscultation bilaterally, no crackles or wheezing  Cardiovascular: Normal apical impulse, regular rate and rhythm, normal S1 and S2, no S3 or S4, and no murmur noted  Skin/Extremites: no rashes and no lesions, 2+ edema in lower ext bilaterally, 1/5 DTR's at knee, no clonus  Neurologic: A&O x 3

## 2023-05-12 NOTE — PROGRESS NOTES
Patient arrived to [facility] for scheduled ripening/induction. Patient oriented to care setting. EUM/EFM applied, see flow sheets for VS.

## 2023-05-13 ENCOUNTER — ANESTHESIA (OUTPATIENT)
Dept: SURGERY | Facility: OTHER | Age: 33
End: 2023-05-13
Payer: COMMERCIAL

## 2023-05-13 ENCOUNTER — TRANSFERRED RECORDS (OUTPATIENT)
Dept: HEALTH INFORMATION MANAGEMENT | Facility: CLINIC | Age: 33
End: 2023-05-13

## 2023-05-13 ENCOUNTER — ANESTHESIA EVENT (OUTPATIENT)
Dept: SURGERY | Facility: OTHER | Age: 33
End: 2023-05-13
Payer: COMMERCIAL

## 2023-05-13 LAB
ALBUMIN SERPL BCG-MCNC: 3.3 G/DL (ref 3.5–5.2)
ALBUMIN SERPL BCG-MCNC: 3.3 G/DL (ref 3.5–5.2)
ALBUMIN SERPL BCG-MCNC: 3.4 G/DL (ref 3.5–5.2)
ALP SERPL-CCNC: 186 U/L (ref 35–104)
ALP SERPL-CCNC: 188 U/L (ref 35–104)
ALP SERPL-CCNC: 189 U/L (ref 35–104)
ALT SERPL W P-5'-P-CCNC: 17 U/L (ref 10–35)
ALT SERPL W P-5'-P-CCNC: 18 U/L (ref 10–35)
ALT SERPL W P-5'-P-CCNC: 19 U/L (ref 10–35)
ANION GAP SERPL CALCULATED.3IONS-SCNC: 14 MMOL/L (ref 7–15)
AST SERPL W P-5'-P-CCNC: 23 U/L (ref 10–35)
AST SERPL W P-5'-P-CCNC: 23 U/L (ref 10–35)
AST SERPL W P-5'-P-CCNC: 28 U/L (ref 10–35)
BILIRUB SERPL-MCNC: 0.2 MG/DL
BILIRUB SERPL-MCNC: 0.2 MG/DL
BILIRUB SERPL-MCNC: 0.3 MG/DL
BLD PROD TYP BPU: NORMAL
BLD PROD TYP BPU: NORMAL
BLOOD COMPONENT TYPE: NORMAL
BLOOD COMPONENT TYPE: NORMAL
BUN SERPL-MCNC: 12.2 MG/DL (ref 6–20)
BUN SERPL-MCNC: 12.4 MG/DL (ref 6–20)
BUN SERPL-MCNC: 13.1 MG/DL (ref 6–20)
CALCIUM SERPL-MCNC: 8.6 MG/DL (ref 8.6–10)
CALCIUM SERPL-MCNC: 8.8 MG/DL (ref 8.6–10)
CALCIUM SERPL-MCNC: 9 MG/DL (ref 8.6–10)
CHLORIDE SERPL-SCNC: 102 MMOL/L (ref 98–107)
CHLORIDE SERPL-SCNC: 103 MMOL/L (ref 98–107)
CHLORIDE SERPL-SCNC: 103 MMOL/L (ref 98–107)
CODING SYSTEM: NORMAL
CODING SYSTEM: NORMAL
CREAT SERPL-MCNC: 0.77 MG/DL (ref 0.51–0.95)
CREAT SERPL-MCNC: 0.82 MG/DL (ref 0.51–0.95)
CREAT SERPL-MCNC: 0.99 MG/DL (ref 0.51–0.95)
CROSSMATCH: NORMAL
CROSSMATCH: NORMAL
DEPRECATED HCO3 PLAS-SCNC: 17 MMOL/L (ref 22–29)
DEPRECATED HCO3 PLAS-SCNC: 19 MMOL/L (ref 22–29)
DEPRECATED HCO3 PLAS-SCNC: 19 MMOL/L (ref 22–29)
ERYTHROCYTE [DISTWIDTH] IN BLOOD BY AUTOMATED COUNT: 12.1 % (ref 10–15)
ERYTHROCYTE [DISTWIDTH] IN BLOOD BY AUTOMATED COUNT: 12.2 % (ref 10–15)
ERYTHROCYTE [DISTWIDTH] IN BLOOD BY AUTOMATED COUNT: 12.5 % (ref 10–15)
GFR SERPL CREATININE-BSD FRML MDRD: 78 ML/MIN/1.73M2
GFR SERPL CREATININE-BSD FRML MDRD: >90 ML/MIN/1.73M2
GFR SERPL CREATININE-BSD FRML MDRD: >90 ML/MIN/1.73M2
GLUCOSE BLDC GLUCOMTR-MCNC: 109 MG/DL (ref 70–99)
GLUCOSE SERPL-MCNC: 100 MG/DL (ref 70–99)
GLUCOSE SERPL-MCNC: 87 MG/DL (ref 70–99)
GLUCOSE SERPL-MCNC: 93 MG/DL (ref 70–99)
HCT VFR BLD AUTO: 34.9 % (ref 35–47)
HCT VFR BLD AUTO: 35 % (ref 35–47)
HCT VFR BLD AUTO: 37.3 % (ref 35–47)
HGB BLD-MCNC: 12.3 G/DL (ref 11.7–15.7)
HGB BLD-MCNC: 12.4 G/DL (ref 11.7–15.7)
HGB BLD-MCNC: 13 G/DL (ref 11.7–15.7)
HOLD SPECIMEN: NORMAL
HOLD SPECIMEN: NORMAL
LDH SERPL L TO P-CCNC: 185 U/L (ref 0–250)
LDH SERPL L TO P-CCNC: 190 U/L (ref 0–250)
LDH SERPL L TO P-CCNC: 230 U/L (ref 0–250)
MCH RBC QN AUTO: 30.6 PG (ref 26.5–33)
MCH RBC QN AUTO: 30.8 PG (ref 26.5–33)
MCH RBC QN AUTO: 30.9 PG (ref 26.5–33)
MCHC RBC AUTO-ENTMCNC: 34.9 G/DL (ref 31.5–36.5)
MCHC RBC AUTO-ENTMCNC: 35.2 G/DL (ref 31.5–36.5)
MCHC RBC AUTO-ENTMCNC: 35.4 G/DL (ref 31.5–36.5)
MCV RBC AUTO: 87 FL (ref 78–100)
MCV RBC AUTO: 88 FL (ref 78–100)
MCV RBC AUTO: 88 FL (ref 78–100)
PLATELET # BLD AUTO: 149 10E3/UL (ref 150–450)
PLATELET # BLD AUTO: 150 10E3/UL (ref 150–450)
PLATELET # BLD AUTO: 153 10E3/UL (ref 150–450)
POTASSIUM SERPL-SCNC: 4.2 MMOL/L (ref 3.4–5.3)
POTASSIUM SERPL-SCNC: 4.2 MMOL/L (ref 3.4–5.3)
POTASSIUM SERPL-SCNC: 4.6 MMOL/L (ref 3.4–5.3)
PROT SERPL-MCNC: 5.5 G/DL (ref 6.4–8.3)
PROT SERPL-MCNC: 5.8 G/DL (ref 6.4–8.3)
PROT SERPL-MCNC: 5.9 G/DL (ref 6.4–8.3)
RBC # BLD AUTO: 3.99 10E6/UL (ref 3.8–5.2)
RBC # BLD AUTO: 4.01 10E6/UL (ref 3.8–5.2)
RBC # BLD AUTO: 4.25 10E6/UL (ref 3.8–5.2)
SODIUM SERPL-SCNC: 134 MMOL/L (ref 136–145)
SODIUM SERPL-SCNC: 135 MMOL/L (ref 136–145)
SODIUM SERPL-SCNC: 136 MMOL/L (ref 136–145)
T PALLIDUM AB SER QL: NONREACTIVE
UNIT ABO/RH: NORMAL
UNIT ABO/RH: NORMAL
UNIT NUMBER: NORMAL
UNIT NUMBER: NORMAL
UNIT STATUS: NORMAL
UNIT STATUS: NORMAL
UNIT TYPE ISBT: 8400
UNIT TYPE ISBT: 8400
WBC # BLD AUTO: 16.2 10E3/UL (ref 4–11)
WBC # BLD AUTO: 16.8 10E3/UL (ref 4–11)
WBC # BLD AUTO: 21.2 10E3/UL (ref 4–11)

## 2023-05-13 PROCEDURE — 88307 TISSUE EXAM BY PATHOLOGIST: CPT

## 2023-05-13 PROCEDURE — 59510 CESAREAN DELIVERY: CPT

## 2023-05-13 PROCEDURE — 258N000003 HC RX IP 258 OP 636

## 2023-05-13 PROCEDURE — 710N000010 HC RECOVERY PHASE 1, LEVEL 2, PER MIN: Performed by: OBSTETRICS & GYNECOLOGY

## 2023-05-13 PROCEDURE — 59510 CESAREAN DELIVERY: CPT | Performed by: OBSTETRICS & GYNECOLOGY

## 2023-05-13 PROCEDURE — 272N000001 HC OR GENERAL SUPPLY STERILE: Performed by: OBSTETRICS & GYNECOLOGY

## 2023-05-13 PROCEDURE — 250N000011 HC RX IP 250 OP 636

## 2023-05-13 PROCEDURE — 120N000001 HC R&B MED SURG/OB

## 2023-05-13 PROCEDURE — 360N000076 HC SURGERY LEVEL 3, PER MIN: Performed by: OBSTETRICS & GYNECOLOGY

## 2023-05-13 PROCEDURE — 10907ZC DRAINAGE OF AMNIOTIC FLUID, THERAPEUTIC FROM PRODUCTS OF CONCEPTION, VIA NATURAL OR ARTIFICIAL OPENING: ICD-10-PCS | Performed by: OBSTETRICS & GYNECOLOGY

## 2023-05-13 PROCEDURE — 250N000009 HC RX 250: Performed by: OBSTETRICS & GYNECOLOGY

## 2023-05-13 PROCEDURE — 250N000013 HC RX MED GY IP 250 OP 250 PS 637: Performed by: OBSTETRICS & GYNECOLOGY

## 2023-05-13 PROCEDURE — 84155 ASSAY OF PROTEIN SERUM: CPT | Performed by: OBSTETRICS & GYNECOLOGY

## 2023-05-13 PROCEDURE — 250N000011 HC RX IP 250 OP 636: Performed by: OBSTETRICS & GYNECOLOGY

## 2023-05-13 PROCEDURE — 80053 COMPREHEN METABOLIC PANEL: CPT | Performed by: OBSTETRICS & GYNECOLOGY

## 2023-05-13 PROCEDURE — 85027 COMPLETE CBC AUTOMATED: CPT | Performed by: OBSTETRICS & GYNECOLOGY

## 2023-05-13 PROCEDURE — 370N000003 HC ANESTHESIA WARD SERVICE

## 2023-05-13 PROCEDURE — 258N000003 HC RX IP 258 OP 636: Performed by: OBSTETRICS & GYNECOLOGY

## 2023-05-13 PROCEDURE — 36415 COLL VENOUS BLD VENIPUNCTURE: CPT | Performed by: OBSTETRICS & GYNECOLOGY

## 2023-05-13 PROCEDURE — 370N000017 HC ANESTHESIA TECHNICAL FEE, PER MIN: Performed by: OBSTETRICS & GYNECOLOGY

## 2023-05-13 PROCEDURE — 250N000009 HC RX 250

## 2023-05-13 PROCEDURE — 83615 LACTATE (LD) (LDH) ENZYME: CPT | Performed by: OBSTETRICS & GYNECOLOGY

## 2023-05-13 RX ORDER — OXYTOCIN 10 [USP'U]/ML
10 INJECTION, SOLUTION INTRAMUSCULAR; INTRAVENOUS
Status: DISCONTINUED | OUTPATIENT
Start: 2023-05-13 | End: 2023-05-13 | Stop reason: HOSPADM

## 2023-05-13 RX ORDER — HYDROMORPHONE HCL IN WATER/PF 6 MG/30 ML
0.4 PATIENT CONTROLLED ANALGESIA SYRINGE INTRAVENOUS EVERY 5 MIN PRN
Status: DISCONTINUED | OUTPATIENT
Start: 2023-05-13 | End: 2023-05-13 | Stop reason: HOSPADM

## 2023-05-13 RX ORDER — MAGNESIUM HYDROXIDE 1200 MG/15ML
LIQUID ORAL PRN
Status: DISCONTINUED | OUTPATIENT
Start: 2023-05-13 | End: 2023-05-13 | Stop reason: HOSPADM

## 2023-05-13 RX ORDER — FENTANYL/BUPIVACAINE/NS/PF 2-1250MCG
PLASTIC BAG, INJECTION (ML) INJECTION CONTINUOUS
Status: DISCONTINUED | OUTPATIENT
Start: 2023-05-13 | End: 2023-05-14 | Stop reason: HOSPADM

## 2023-05-13 RX ORDER — OXYTOCIN 10 [USP'U]/ML
INJECTION, SOLUTION INTRAMUSCULAR; INTRAVENOUS PRN
Status: DISCONTINUED | OUTPATIENT
Start: 2023-05-13 | End: 2023-05-13

## 2023-05-13 RX ORDER — FENTANYL CITRATE-0.9 % NACL/PF 10 MCG/ML
100 PLASTIC BAG, INJECTION (ML) INTRAVENOUS EVERY 5 MIN PRN
Status: DISCONTINUED | OUTPATIENT
Start: 2023-05-13 | End: 2023-05-14 | Stop reason: HOSPADM

## 2023-05-13 RX ORDER — LIDOCAINE 40 MG/G
CREAM TOPICAL
Status: DISCONTINUED | OUTPATIENT
Start: 2023-05-13 | End: 2023-05-13 | Stop reason: HOSPADM

## 2023-05-13 RX ORDER — TRANEXAMIC ACID 10 MG/ML
1 INJECTION, SOLUTION INTRAVENOUS EVERY 30 MIN PRN
Status: DISCONTINUED | OUTPATIENT
Start: 2023-05-13 | End: 2023-05-13 | Stop reason: HOSPADM

## 2023-05-13 RX ORDER — CITRIC ACID/SODIUM CITRATE 334-500MG
30 SOLUTION, ORAL ORAL
Status: DISCONTINUED | OUTPATIENT
Start: 2023-05-13 | End: 2023-05-13 | Stop reason: HOSPADM

## 2023-05-13 RX ORDER — CARBOPROST TROMETHAMINE 250 UG/ML
250 INJECTION, SOLUTION INTRAMUSCULAR
Status: DISCONTINUED | OUTPATIENT
Start: 2023-05-13 | End: 2023-05-13 | Stop reason: HOSPADM

## 2023-05-13 RX ORDER — LIDOCAINE 40 MG/G
CREAM TOPICAL
Status: DISCONTINUED | OUTPATIENT
Start: 2023-05-13 | End: 2023-05-14 | Stop reason: HOSPADM

## 2023-05-13 RX ORDER — CLINDAMYCIN PHOSPHATE 900 MG/50ML
900 INJECTION, SOLUTION INTRAVENOUS
Status: DISCONTINUED | OUTPATIENT
Start: 2023-05-13 | End: 2023-05-13 | Stop reason: HOSPADM

## 2023-05-13 RX ORDER — FENTANYL CITRATE 50 UG/ML
25 INJECTION, SOLUTION INTRAMUSCULAR; INTRAVENOUS EVERY 5 MIN PRN
Status: DISCONTINUED | OUTPATIENT
Start: 2023-05-13 | End: 2023-05-13 | Stop reason: HOSPADM

## 2023-05-13 RX ORDER — PENICILLIN G 3000000 [IU]/50ML
3 INJECTION, SOLUTION INTRAVENOUS EVERY 4 HOURS
Status: DISCONTINUED | OUTPATIENT
Start: 2023-05-13 | End: 2023-05-13

## 2023-05-13 RX ORDER — AZITHROMYCIN 500 MG/5ML
500 INJECTION, POWDER, LYOPHILIZED, FOR SOLUTION INTRAVENOUS
Status: COMPLETED | OUTPATIENT
Start: 2023-05-13 | End: 2023-05-13

## 2023-05-13 RX ORDER — OXYTOCIN/0.9 % SODIUM CHLORIDE 30/500 ML
340 PLASTIC BAG, INJECTION (ML) INTRAVENOUS CONTINUOUS PRN
Status: DISCONTINUED | OUTPATIENT
Start: 2023-05-13 | End: 2023-05-13 | Stop reason: HOSPADM

## 2023-05-13 RX ORDER — OXYCODONE HYDROCHLORIDE 5 MG/1
5-10 TABLET ORAL EVERY 4 HOURS PRN
Status: DISCONTINUED | OUTPATIENT
Start: 2023-05-13 | End: 2023-05-14 | Stop reason: HOSPADM

## 2023-05-13 RX ORDER — CITRIC ACID/SODIUM CITRATE 334-500MG
30 SOLUTION, ORAL ORAL ONCE
Status: COMPLETED | OUTPATIENT
Start: 2023-05-13 | End: 2023-05-13

## 2023-05-13 RX ORDER — SODIUM CHLORIDE, SODIUM LACTATE, POTASSIUM CHLORIDE, CALCIUM CHLORIDE 600; 310; 30; 20 MG/100ML; MG/100ML; MG/100ML; MG/100ML
INJECTION, SOLUTION INTRAVENOUS CONTINUOUS PRN
Status: DISCONTINUED | OUTPATIENT
Start: 2023-05-13 | End: 2023-05-14 | Stop reason: HOSPADM

## 2023-05-13 RX ORDER — BUPIVACAINE HYDROCHLORIDE 7.5 MG/ML
INJECTION, SOLUTION INTRASPINAL
Status: COMPLETED | OUTPATIENT
Start: 2023-05-13 | End: 2023-05-13

## 2023-05-13 RX ORDER — ONDANSETRON 2 MG/ML
4 INJECTION INTRAMUSCULAR; INTRAVENOUS EVERY 30 MIN PRN
Status: DISCONTINUED | OUTPATIENT
Start: 2023-05-13 | End: 2023-05-13 | Stop reason: HOSPADM

## 2023-05-13 RX ORDER — SODIUM CHLORIDE, SODIUM LACTATE, POTASSIUM CHLORIDE, CALCIUM CHLORIDE 600; 310; 30; 20 MG/100ML; MG/100ML; MG/100ML; MG/100ML
INJECTION, SOLUTION INTRAVENOUS CONTINUOUS
Status: DISCONTINUED | OUTPATIENT
Start: 2023-05-13 | End: 2023-05-13 | Stop reason: HOSPADM

## 2023-05-13 RX ORDER — LIDOCAINE HYDROCHLORIDE AND EPINEPHRINE 15; 5 MG/ML; UG/ML
INJECTION, SOLUTION EPIDURAL PRN
Status: DISCONTINUED | OUTPATIENT
Start: 2023-05-13 | End: 2023-05-13

## 2023-05-13 RX ORDER — ONDANSETRON 4 MG/1
4 TABLET, ORALLY DISINTEGRATING ORAL EVERY 30 MIN PRN
Status: DISCONTINUED | OUTPATIENT
Start: 2023-05-13 | End: 2023-05-13 | Stop reason: HOSPADM

## 2023-05-13 RX ORDER — MORPHINE SULFATE 1 MG/ML
INJECTION, SOLUTION EPIDURAL; INTRATHECAL; INTRAVENOUS
Status: COMPLETED | OUTPATIENT
Start: 2023-05-13 | End: 2023-05-13

## 2023-05-13 RX ORDER — CLINDAMYCIN PHOSPHATE 900 MG/50ML
INJECTION, SOLUTION INTRAVENOUS PRN
Status: DISCONTINUED | OUTPATIENT
Start: 2023-05-13 | End: 2023-05-13

## 2023-05-13 RX ORDER — MISOPROSTOL 100 UG/1
400 TABLET ORAL
Status: DISCONTINUED | OUTPATIENT
Start: 2023-05-13 | End: 2023-05-13 | Stop reason: HOSPADM

## 2023-05-13 RX ORDER — ONDANSETRON 2 MG/ML
INJECTION INTRAMUSCULAR; INTRAVENOUS PRN
Status: DISCONTINUED | OUTPATIENT
Start: 2023-05-13 | End: 2023-05-13

## 2023-05-13 RX ORDER — LIDOCAINE HYDROCHLORIDE AND EPINEPHRINE 15; 5 MG/ML; UG/ML
3 INJECTION, SOLUTION EPIDURAL
Status: DISCONTINUED | OUTPATIENT
Start: 2023-05-13 | End: 2023-05-14 | Stop reason: HOSPADM

## 2023-05-13 RX ORDER — FENTANYL CITRATE 50 UG/ML
50 INJECTION, SOLUTION INTRAMUSCULAR; INTRAVENOUS EVERY 5 MIN PRN
Status: DISCONTINUED | OUTPATIENT
Start: 2023-05-13 | End: 2023-05-13 | Stop reason: HOSPADM

## 2023-05-13 RX ORDER — NALBUPHINE HYDROCHLORIDE 10 MG/ML
2.5-5 INJECTION, SOLUTION INTRAMUSCULAR; INTRAVENOUS; SUBCUTANEOUS EVERY 6 HOURS PRN
Status: DISCONTINUED | OUTPATIENT
Start: 2023-05-13 | End: 2023-05-14

## 2023-05-13 RX ORDER — METHYLERGONOVINE MALEATE 0.2 MG/ML
200 INJECTION INTRAVENOUS
Status: DISCONTINUED | OUTPATIENT
Start: 2023-05-13 | End: 2023-05-13 | Stop reason: HOSPADM

## 2023-05-13 RX ORDER — LIDOCAINE HYDROCHLORIDE 10 MG/ML
INJECTION, SOLUTION INFILTRATION; PERINEURAL PRN
Status: DISCONTINUED | OUTPATIENT
Start: 2023-05-13 | End: 2023-05-13

## 2023-05-13 RX ORDER — CLINDAMYCIN PHOSPHATE 900 MG/50ML
900 INJECTION, SOLUTION INTRAVENOUS ONCE
Status: DISCONTINUED | OUTPATIENT
Start: 2023-05-13 | End: 2023-05-13

## 2023-05-13 RX ORDER — HYDROMORPHONE HCL IN WATER/PF 6 MG/30 ML
0.2 PATIENT CONTROLLED ANALGESIA SYRINGE INTRAVENOUS EVERY 5 MIN PRN
Status: DISCONTINUED | OUTPATIENT
Start: 2023-05-13 | End: 2023-05-13 | Stop reason: HOSPADM

## 2023-05-13 RX ORDER — OXYTOCIN/0.9 % SODIUM CHLORIDE 30/500 ML
1-24 PLASTIC BAG, INJECTION (ML) INTRAVENOUS CONTINUOUS
Status: DISCONTINUED | OUTPATIENT
Start: 2023-05-13 | End: 2023-05-14 | Stop reason: HOSPADM

## 2023-05-13 RX ADMIN — PENICILLIN G 3 MILLION UNITS: 3000000 INJECTION, SOLUTION INTRAVENOUS at 18:53

## 2023-05-13 RX ADMIN — CLINDAMYCIN IN 5 PERCENT DEXTROSE 900 MG: 18 INJECTION, SOLUTION INTRAVENOUS at 20:00

## 2023-05-13 RX ADMIN — Medication: at 18:48

## 2023-05-13 RX ADMIN — Medication 500 MG: at 19:54

## 2023-05-13 RX ADMIN — MISOPROSTOL 25 MCG: 100 TABLET ORAL at 02:52

## 2023-05-13 RX ADMIN — SODIUM CHLORIDE, POTASSIUM CHLORIDE, SODIUM LACTATE AND CALCIUM CHLORIDE: 600; 310; 30; 20 INJECTION, SOLUTION INTRAVENOUS at 04:00

## 2023-05-13 RX ADMIN — OXYTOCIN 10 UNITS: 10 INJECTION INTRAVENOUS at 20:36

## 2023-05-13 RX ADMIN — FENTANYL CITRATE 100 MCG: 50 INJECTION, SOLUTION INTRAMUSCULAR; INTRAVENOUS at 01:40

## 2023-05-13 RX ADMIN — LIDOCAINE HYDROCHLORIDE AND EPINEPHRINE 3 ML: 15; 5 INJECTION, SOLUTION EPIDURAL at 03:47

## 2023-05-13 RX ADMIN — TRANEXAMIC ACID 1 G: 10 INJECTION, SOLUTION INTRAVENOUS at 20:31

## 2023-05-13 RX ADMIN — SODIUM CHLORIDE 5 MILLION UNITS: 900 INJECTION INTRAVENOUS at 08:37

## 2023-05-13 RX ADMIN — SODIUM CITRATE AND CITRIC ACID MONOHYDRATE 30 ML: 500; 334 SOLUTION ORAL at 10:37

## 2023-05-13 RX ADMIN — Medication 500 MG: at 20:00

## 2023-05-13 RX ADMIN — Medication: at 10:41

## 2023-05-13 RX ADMIN — BUPIVACAINE HYDROCHLORIDE 1.6 ML: 7.5 INJECTION, SOLUTION INTRASPINAL at 20:09

## 2023-05-13 RX ADMIN — ONDANSETRON HYDROCHLORIDE 4 MG: 2 SOLUTION INTRAMUSCULAR; INTRAVENOUS at 20:03

## 2023-05-13 RX ADMIN — Medication 2 MILLI-UNITS/MIN: at 07:43

## 2023-05-13 RX ADMIN — Medication 10 ML/HR: at 03:55

## 2023-05-13 RX ADMIN — SODIUM CHLORIDE, POTASSIUM CHLORIDE, SODIUM LACTATE AND CALCIUM CHLORIDE: 600; 310; 30; 20 INJECTION, SOLUTION INTRAVENOUS at 12:15

## 2023-05-13 RX ADMIN — LIDOCAINE HYDROCHLORIDE AND EPINEPHRINE 2 ML: 15; 5 INJECTION, SOLUTION EPIDURAL at 03:52

## 2023-05-13 RX ADMIN — ONDANSETRON 4 MG: 2 INJECTION INTRAMUSCULAR; INTRAVENOUS at 01:36

## 2023-05-13 RX ADMIN — LIDOCAINE HYDROCHLORIDE 2 ML: 10 INJECTION, SOLUTION INFILTRATION; PERINEURAL at 03:41

## 2023-05-13 RX ADMIN — PHENYLEPHRINE HYDROCHLORIDE 0.5 MCG/KG/MIN: 10 INJECTION INTRAVENOUS at 20:09

## 2023-05-13 RX ADMIN — MORPHINE SULFATE 0.1 MG: 1 INJECTION, SOLUTION EPIDURAL; INTRATHECAL; INTRAVENOUS at 20:09

## 2023-05-13 RX ADMIN — SODIUM CHLORIDE, SODIUM LACTATE, POTASSIUM CHLORIDE, AND CALCIUM CHLORIDE: 600; 310; 30; 20 INJECTION, SOLUTION INTRAVENOUS at 20:00

## 2023-05-13 RX ADMIN — METHYLERGONOVINE MALEATE 200 MCG: 0.2 INJECTION INTRAVENOUS at 20:31

## 2023-05-13 RX ADMIN — PENICILLIN G 3 MILLION UNITS: 3000000 INJECTION, SOLUTION INTRAVENOUS at 12:16

## 2023-05-13 ASSESSMENT — ACTIVITIES OF DAILY LIVING (ADL)
ADLS_ACUITY_SCORE: 20

## 2023-05-13 NOTE — PROGRESS NOTES
Patient utilized side lying release position to the right for approximately 45 min-1 hour with good relief of right sided pain/discomfort. CRNA at the bedside at approximately 1030 to assess patient comfort.

## 2023-05-13 NOTE — PROGRESS NOTES
Patient remains uncomfortable on the right hip, repositioning continued. CRNA contacted for additional support and will round on patient.

## 2023-05-13 NOTE — PROGRESS NOTES
Pt's pain has been increasing.  One dose of fentynal was given with minimal relief.  Pt would like an epidural.  Anaesthesia was called, bolus started.  Pt is also having nausea and vomiting despite having been given a dose of zofran.  She states the nausea comes and goes with the cramping/contractions.  Keesha Tom RN............................ 5/13/2023 3:09 AM

## 2023-05-13 NOTE — PROGRESS NOTES
Patient complaints of discomfort on her right side but desires to be left side lying. Patient educated provided and patient wishes to be on the left, patient repositioned. Patient educated to call writer if pain on the right is unbearable and would then be agreeable to turn on the right.

## 2023-05-13 NOTE — ANESTHESIA PROCEDURE NOTES
"Epidural catheter Procedure Note    Pre-Procedure   Staff -        CRNA: Eliseo Broussard APRN CRNA       Performed By: CRNA       Location: OB       Procedure Start/Stop Times: 5/13/2023 3:33 AM and 5/13/2023 4:00 AM       Pre-Anesthestic Checklist: patient identified, IV checked, risks and benefits discussed, informed consent, monitors and equipment checked, pre-op evaluation, at physician/surgeon's request and post-op pain management  Timeout:       Correct Patient: Yes        Correct Procedure: Yes        Correct Site: Yes        Correct Position: Yes   Procedure Documentation  Procedure: epidural catheter       Diagnosis: Labor analgesia       Patient Position: sitting       Patient Prep/Sterile Barriers: sterile gloves, mask, patient draped       Skin prep: Chloraprep       Local skin infiltrated with mL of 1% lidocaine.        Insertion Site: L3-4. (midline approach).       Technique: LORT air        DIPTI at 4.5 cm.       Needle Type: Touhy needle       Needle Gauge: 17.        Needle Length (Inches): 5        Catheter: 19 G.          Catheter threaded easily.           Threaded 11 cm at skin.         # of attempts: 1 and  # of redirects:  0    Assessment/Narrative         Paresthesias: No.       Test dose of 3 mL lidocaine 1.5% w/ 1:200,000 epinephrine at 03:47 CDT.         Test dose negative, 3 minutes after injection, for signs of intravascular, subdural, or intrathecal injection.       Insertion/Infusion Method: LORT air       Aspiration negative for Heme or CSF via Epidural Catheter.    Medication(s) Administered   Medication Administration Time: 5/13/2023 3:33 AM      FOR [employer] [facility] ONLY:   Pain Team Contact information: please page the Pain Team Via [employer]. Search \"Pain\". During daytime hours, please page the attending first. At night please page the resident first.      "

## 2023-05-13 NOTE — PLAN OF CARE
Patient arrived from home with  for scheduled induction.  Admitted to [facility]. Oriented to room.  Category I tracing. Patient is not feeling any contractions.  BP somewhat elevated. New orders received.

## 2023-05-13 NOTE — PROGRESS NOTES
Spoke with Jason, pt can have an epidural anytime.  Keesha Tom RN............................ 5/13/2023 2:09 AM

## 2023-05-13 NOTE — PLAN OF CARE
VSS - had a few higher BP's during epidural insertion and one when pt was experiencing some increased pain a couple of hours after epidural.  At this time pt was repositioned and she pushed epidural button.  After that she was much more comfortable and was able to rest.  BP came down as well.  See flowsheets.  Cytotec insertion got off schedule due to pt's nausea and vomiting, but pt is rae every 1.5-5 minutes and is making cervical change.  Last cervical check, pt was 3-4 and 80% at 0440.  Pt is currently resting comfortably.  Keesha Tom RN............................ 5/13/2023 6:22 AM

## 2023-05-13 NOTE — PROGRESS NOTES
cx /0 station MARLIN, less asynclitic, some moulding noted with mild caput  Discussed second stage and use of vacuum or forceps delivery and possible risks, benefits, alternatives and indications for use. She verbalized no objections at this time and seems to understand. Discussed alternative of  if fetal indications arise and vacuum or forceps are deemed not appropriate at that instant.     Currently will allow labor to progress with reassuring EFM and recheck in 30-60 min for progress.    Dawson Gomez MD FACOG  2:23 PM 2023

## 2023-05-13 NOTE — ANESTHESIA PREPROCEDURE EVALUATION
Anesthesia Pre-Procedure Evaluation    Patient: Raina Javier   MRN: [id number] : 1990        Procedure : * No procedures listed *          Past Medical History:   Diagnosis Date     Acid reflux      Anxiety      Depression      Depressive disorder      Fructose intolerance      IgA deficiency (H)     Diagnosed in . NO blood trasnfusions with IgA- medical alert     Kidney stone      Uncomplicated asthma       Past Surgical History:   Procedure Laterality Date     SINUS SURGERY        Allergies   Allergen Reactions     Gentamycin [Gentamicin Sulfate]      Eye drops, swelling of the eyes and redness     Loracarbef Rash      Social History     Tobacco Use     Smoking status: Never     Smokeless tobacco: Never   Vaping Use     Vaping status: Never Used   Substance Use Topics     Alcohol use: Not Currently      Wt Readings from Last 1 Encounters:   23 88.9 kg (196 lb)        Anesthesia Evaluation   Pt has not had prior anesthetic         ROS/MED HX  ENT/Pulmonary:     (+) Intermittent, asthma Treatment: Inhaler prn,      Neurologic:       Cardiovascular:       METS/Exercise Tolerance: >4 METS    Hematologic:  - neg hematologic  ROS     Musculoskeletal:  - neg musculoskeletal ROS     GI/Hepatic:     (+) GERD, Symptomatic,     Renal/Genitourinary: Comment: History of kidney stones, denies kidney disease      Endo:  - neg endo ROS     Psychiatric/Substance Use:     (+) psychiatric history anxiety and depression     Infectious Disease: Comment: IgA Deficiency      Malignancy:  - neg malignancy ROS     Other:      (+) Possibly pregnant, ,         Physical Exam    Airway        Mallampati: IV   TM distance: > 3 FB   Neck ROM: full   Mouth opening: < 3 cm    Respiratory Devices and Support         Dental       (+) Minor Abnormalities - some fillings, tiny chips      Cardiovascular   cardiovascular exam normal       Rhythm and rate: regular and normal     Pulmonary   pulmonary exam  normal        breath sounds clear to auscultation           OUTSIDE LABS:  CBC:   Lab Results   Component Value Date    WBC 8.5 2023    WBC 9.4 2023    HGB 11.9 2023    HGB 11.6 (L) 2023    HCT 34.1 (L) 2023    HCT 34.1 (L) 2023     (L) 2023     2023     BMP:   Lab Results   Component Value Date     (L) 2023    POTASSIUM 4.3 2023    CHLORIDE 104 2023    CO2 18 (L) 2023    BUN 11.1 2023    CR 0.97 (H) 2023    GLC 83 2023     COAGS: No results found for: PTT, INR, FIBR  POC: No results found for: BGM, HCG, HCGS  HEPATIC:   Lab Results   Component Value Date    ALBUMIN 3.5 2023    PROTTOTAL 6.1 (L) 2023    ALT 19 2023    AST 26 2023    ALKPHOS 194 (H) 2023    BILITOTAL <0.2 2023     OTHER:   Lab Results   Component Value Date    APOLLO 9.5 2023    TSH 1.23 2023       Anesthesia Plan    ASA Status:  2      Anesthesia Type: Spinal (Stop epidural pump and place spinal for ).              Consents    Anesthesia Plan(s) and associated risks, benefits, and realistic alternatives discussed. Questions answered and patient/representative(s) expressed understanding.     - Discussed: Risks, Benefits and Alternatives for BOTH SEDATION and the PROCEDURE were discussed     - Discussed with:  Patient, Spouse      - Extended Intubation/Ventilatory Support Discussed: No.      - Patient is DNR/DNI Status: No    Use of blood products discussed: No .     Postoperative Care    Pain management: IV analgesics, intrathecal morphine, Multi-modal analgesia.   PONV prophylaxis: Ondansetron (or other 5HT-3)     Comments:                FRANK Song CRNA

## 2023-05-13 NOTE — PROGRESS NOTES
cx 7-8/80/-1 to 0, MARLIN with anterior asyncliticism  Bloody vaginal discharge, no meconium noted, but minimal fluid return with attempt at AROM with amnihook    Epidural working well  Anticipate   PRBC's- 2 units- washed are available and  at 2100

## 2023-05-13 NOTE — PROGRESS NOTES
Cx 3-4 cm per RN  Pitocin running  EFM cat 1  [place] q 2-4  Epidural in    Plan to start GBS px, AROM when appropriate  Call for PRBC's-washed to be prepared for delivery

## 2023-05-13 NOTE — PROGRESS NOTES
Pushing now for 90+ min.  Good maternal efforts  Fetal station is +1/3 with some moulding and moderate caput in MARLIN position with anterior asyncliticism  Vulvar and vaginal swelling noted  Epidural working but with right back hot spot    EFM baseline 140 with variable decels with each push with late component to 90's lasting 30-40 sec    I/P Second stage  Continue pushing, conservative measures to enhance uteroplacental sufficiency.  Consider assisted delivery if fetal head reaches +2/3 station  Recommend  if fetus is intolerant of pushing, becoming tachycardic at baseline or losing variability with prolonged decels.

## 2023-05-13 NOTE — PROGRESS NOTES
Results for orders placed or performed during the hospital encounter of 05/12/23   CBC with platelets     Status: Abnormal   Result Value Ref Range    WBC Count 8.5 4.0 - 11.0 10e3/uL    RBC Count 3.90 3.80 - 5.20 10e6/uL    Hemoglobin 11.9 11.7 - 15.7 g/dL    Hematocrit 34.1 (L) 35.0 - 47.0 %    MCV 87 78 - 100 fL    MCH 30.5 26.5 - 33.0 pg    MCHC 34.9 31.5 - 36.5 g/dL    RDW 12.2 10.0 - 15.0 %    Platelet Count 149 (L) 150 - 450 10e3/uL   Extra Tube     Status: None    Narrative    The following orders were created for panel order Extra Tube.  Procedure                               Abnormality         Status                     ---------                               -----------         ------                     Extra Green Top (Lithium...[[id number]]                      Final result                 Please view results for these tests on the individual orders.   Extra Green Top (Lithium Heparin) Tube     Status: None   Result Value Ref Range    Hold Specimen x    Protein  random urine     Status: Abnormal   Result Value Ref Range    Total Protein Urine mg/dL 42.5 (H) 1.0 - 14.0 mg/dL    Total Protein UR MG/MG CR 0.73 (H) 0.00 - 0.20 mg/mg Cr    Creatinine Urine mg/dL 58.3 mg/dL   Comprehensive metabolic panel     Status: Abnormal   Result Value Ref Range    Sodium 133 (L) 136 - 145 mmol/L    Potassium 4.3 3.4 - 5.3 mmol/L    Chloride 104 98 - 107 mmol/L    Carbon Dioxide (CO2) 18 (L) 22 - 29 mmol/L    Anion Gap 11 7 - 15 mmol/L    Urea Nitrogen 11.1 6.0 - 20.0 mg/dL    Creatinine 0.97 (H) 0.51 - 0.95 mg/dL    Calcium 9.5 8.6 - 10.0 mg/dL    Glucose 83 70 - 99 mg/dL    Alkaline Phosphatase 194 (H) 35 - 104 U/L    AST 26 10 - 35 U/L    ALT 19 10 - 35 U/L    Protein Total 6.1 (L) 6.4 - 8.3 g/dL    Albumin 3.5 3.5 - 5.2 g/dL    Bilirubin Total <0.2 <=1.2 mg/dL    GFR Estimate 80 >60 mL/min/1.73m2   Adult Type and Screen     Status: None   Result Value Ref Range    ABO/RH(D) AB POS     Antibody Screen Negative  Negative    SPECIMEN EXPIRATION DATE [id number]    ABO/Rh type and screen     Status: None    Narrative    The following orders were created for panel order ABO/Rh type and screen.  Procedure                               Abnormality         Status                     ---------                               -----------         ------                     Adult Type and Screen[[id number]]                            Final result                 Please view results for these tests on the individual orders.     Labs consistent with preeclampsia without severe features.  If severe features develop will start Mag Px.  Patient currently asymptomatic  Continue with labor induction.

## 2023-05-13 NOTE — PROGRESS NOTES
Spoke to Dr Gomez - need protein/creatine ratio, called lab for this.  Also asked when to call for washed RBC's, Dr Gomez said to call when pt is in active labor.  Keesha Tom RN............................ 5/12/2023 8:12 PM

## 2023-05-13 NOTE — PROGRESS NOTES
Patient states she is comfortable with her epidural at this time. Repositioned as needed.  at the bedside and supportive throughout labor process.

## 2023-05-14 VITALS
HEART RATE: 76 BPM | BODY MASS INDEX: 31.92 KG/M2 | TEMPERATURE: 97.3 F | DIASTOLIC BLOOD PRESSURE: 67 MMHG | HEIGHT: 64 IN | SYSTOLIC BLOOD PRESSURE: 129 MMHG | OXYGEN SATURATION: 98 % | WEIGHT: 187 LBS | RESPIRATION RATE: 16 BRPM

## 2023-05-14 PROBLEM — Z98.891 S/P CESAREAN SECTION: Status: ACTIVE | Noted: 2023-05-14

## 2023-05-14 PROBLEM — O33.1 CEPHALOPELVIC DISPROPORTION DUE TO GENERALLY CONTRACTED PELVIS: Status: ACTIVE | Noted: 2023-05-14

## 2023-05-14 PROBLEM — B18.1 HEPATITIS B, CHRONIC (H): Status: RESOLVED | Noted: 2023-05-01 | Resolved: 2023-05-14

## 2023-05-14 LAB
ALBUMIN SERPL BCG-MCNC: 2.9 G/DL (ref 3.5–5.2)
ALP SERPL-CCNC: 146 U/L (ref 35–104)
ALT SERPL W P-5'-P-CCNC: 17 U/L (ref 10–35)
ANION GAP SERPL CALCULATED.3IONS-SCNC: 8 MMOL/L (ref 7–15)
AST SERPL W P-5'-P-CCNC: 31 U/L (ref 10–35)
BILIRUB SERPL-MCNC: 0.2 MG/DL
BUN SERPL-MCNC: 11.7 MG/DL (ref 6–20)
CALCIUM SERPL-MCNC: 8.7 MG/DL (ref 8.6–10)
CHLORIDE SERPL-SCNC: 105 MMOL/L (ref 98–107)
CREAT SERPL-MCNC: 0.81 MG/DL (ref 0.51–0.95)
DEPRECATED HCO3 PLAS-SCNC: 22 MMOL/L (ref 22–29)
ERYTHROCYTE [DISTWIDTH] IN BLOOD BY AUTOMATED COUNT: 12.5 % (ref 10–15)
ERYTHROCYTE [DISTWIDTH] IN BLOOD BY AUTOMATED COUNT: 12.6 % (ref 10–15)
GFR SERPL CREATININE-BSD FRML MDRD: >90 ML/MIN/1.73M2
GLUCOSE SERPL-MCNC: 103 MG/DL (ref 70–99)
HCT VFR BLD AUTO: 32 % (ref 35–47)
HCT VFR BLD AUTO: 35.9 % (ref 35–47)
HGB BLD-MCNC: 11.2 G/DL (ref 11.7–15.7)
HGB BLD-MCNC: 12.4 G/DL (ref 11.7–15.7)
HOLD SPECIMEN: NORMAL
MCH RBC QN AUTO: 30.9 PG (ref 26.5–33)
MCH RBC QN AUTO: 31.1 PG (ref 26.5–33)
MCHC RBC AUTO-ENTMCNC: 34.5 G/DL (ref 31.5–36.5)
MCHC RBC AUTO-ENTMCNC: 35 G/DL (ref 31.5–36.5)
MCV RBC AUTO: 88 FL (ref 78–100)
MCV RBC AUTO: 90 FL (ref 78–100)
PLATELET # BLD AUTO: 127 10E3/UL (ref 150–450)
PLATELET # BLD AUTO: 143 10E3/UL (ref 150–450)
POTASSIUM SERPL-SCNC: 4.5 MMOL/L (ref 3.4–5.3)
PROT SERPL-MCNC: 5.2 G/DL (ref 6.4–8.3)
RBC # BLD AUTO: 3.62 10E6/UL (ref 3.8–5.2)
RBC # BLD AUTO: 3.99 10E6/UL (ref 3.8–5.2)
SODIUM SERPL-SCNC: 135 MMOL/L (ref 136–145)
WBC # BLD AUTO: 17.1 10E3/UL (ref 4–11)
WBC # BLD AUTO: 18.5 10E3/UL (ref 4–11)

## 2023-05-14 PROCEDURE — 250N000013 HC RX MED GY IP 250 OP 250 PS 637: Performed by: OBSTETRICS & GYNECOLOGY

## 2023-05-14 PROCEDURE — 99207 PR NO CHARGE LOS: CPT | Performed by: OBSTETRICS & GYNECOLOGY

## 2023-05-14 PROCEDURE — 250N000011 HC RX IP 250 OP 636: Performed by: OBSTETRICS & GYNECOLOGY

## 2023-05-14 PROCEDURE — 85027 COMPLETE CBC AUTOMATED: CPT | Performed by: OBSTETRICS & GYNECOLOGY

## 2023-05-14 PROCEDURE — 36415 COLL VENOUS BLD VENIPUNCTURE: CPT | Performed by: OBSTETRICS & GYNECOLOGY

## 2023-05-14 PROCEDURE — 80053 COMPREHEN METABOLIC PANEL: CPT | Performed by: OBSTETRICS & GYNECOLOGY

## 2023-05-14 RX ORDER — METHYLERGONOVINE MALEATE 0.2 MG/ML
200 INJECTION INTRAVENOUS
Status: DISCONTINUED | OUTPATIENT
Start: 2023-05-14 | End: 2023-05-14 | Stop reason: HOSPADM

## 2023-05-14 RX ORDER — CITALOPRAM HYDROBROMIDE 20 MG/1
20 TABLET ORAL DAILY
Qty: 90 TABLET | Refills: 3 | Status: SHIPPED | OUTPATIENT
Start: 2023-05-14 | End: 2024-01-05

## 2023-05-14 RX ORDER — ONDANSETRON 4 MG/1
4 TABLET, ORALLY DISINTEGRATING ORAL EVERY 6 HOURS PRN
Status: DISCONTINUED | OUTPATIENT
Start: 2023-05-14 | End: 2023-05-14 | Stop reason: HOSPADM

## 2023-05-14 RX ORDER — IBUPROFEN 400 MG/1
800 TABLET, FILM COATED ORAL EVERY 6 HOURS
Status: DISCONTINUED | OUTPATIENT
Start: 2023-05-14 | End: 2023-05-14 | Stop reason: HOSPADM

## 2023-05-14 RX ORDER — OXYTOCIN/0.9 % SODIUM CHLORIDE 30/500 ML
340 PLASTIC BAG, INJECTION (ML) INTRAVENOUS CONTINUOUS PRN
Status: DISCONTINUED | OUTPATIENT
Start: 2023-05-14 | End: 2023-05-14 | Stop reason: HOSPADM

## 2023-05-14 RX ORDER — PROCHLORPERAZINE MALEATE 10 MG
10 TABLET ORAL EVERY 6 HOURS PRN
Status: DISCONTINUED | OUTPATIENT
Start: 2023-05-14 | End: 2023-05-14 | Stop reason: HOSPADM

## 2023-05-14 RX ORDER — SIMETHICONE 80 MG
80 TABLET,CHEWABLE ORAL 4 TIMES DAILY PRN
Status: DISCONTINUED | OUTPATIENT
Start: 2023-05-14 | End: 2023-05-14 | Stop reason: HOSPADM

## 2023-05-14 RX ORDER — OXYTOCIN 10 [USP'U]/ML
10 INJECTION, SOLUTION INTRAMUSCULAR; INTRAVENOUS
Status: DISCONTINUED | OUTPATIENT
Start: 2023-05-14 | End: 2023-05-14 | Stop reason: HOSPADM

## 2023-05-14 RX ORDER — AMOXICILLIN 250 MG
2 CAPSULE ORAL 2 TIMES DAILY
Status: DISCONTINUED | OUTPATIENT
Start: 2023-05-14 | End: 2023-05-14 | Stop reason: HOSPADM

## 2023-05-14 RX ORDER — ACETAMINOPHEN 325 MG/1
975 TABLET ORAL EVERY 6 HOURS
Status: DISCONTINUED | OUTPATIENT
Start: 2023-05-14 | End: 2023-05-14 | Stop reason: HOSPADM

## 2023-05-14 RX ORDER — TRANEXAMIC ACID 10 MG/ML
1 INJECTION, SOLUTION INTRAVENOUS EVERY 30 MIN PRN
Status: DISCONTINUED | OUTPATIENT
Start: 2023-05-14 | End: 2023-05-14 | Stop reason: HOSPADM

## 2023-05-14 RX ORDER — CARBOPROST TROMETHAMINE 250 UG/ML
250 INJECTION, SOLUTION INTRAMUSCULAR
Status: DISCONTINUED | OUTPATIENT
Start: 2023-05-14 | End: 2023-05-14 | Stop reason: HOSPADM

## 2023-05-14 RX ORDER — ONDANSETRON 2 MG/ML
4 INJECTION INTRAMUSCULAR; INTRAVENOUS EVERY 6 HOURS PRN
Status: DISCONTINUED | OUTPATIENT
Start: 2023-05-14 | End: 2023-05-14 | Stop reason: HOSPADM

## 2023-05-14 RX ORDER — CITALOPRAM HYDROBROMIDE 20 MG/1
20 TABLET ORAL DAILY
Status: DISCONTINUED | OUTPATIENT
Start: 2023-05-14 | End: 2023-05-14 | Stop reason: HOSPADM

## 2023-05-14 RX ORDER — OXYTOCIN/0.9 % SODIUM CHLORIDE 30/500 ML
100-340 PLASTIC BAG, INJECTION (ML) INTRAVENOUS CONTINUOUS PRN
Status: DISCONTINUED | OUTPATIENT
Start: 2023-05-14 | End: 2023-05-14 | Stop reason: HOSPADM

## 2023-05-14 RX ORDER — METOCLOPRAMIDE 10 MG/1
10 TABLET ORAL EVERY 6 HOURS PRN
Status: DISCONTINUED | OUTPATIENT
Start: 2023-05-14 | End: 2023-05-14 | Stop reason: HOSPADM

## 2023-05-14 RX ORDER — PROCHLORPERAZINE 25 MG
25 SUPPOSITORY, RECTAL RECTAL EVERY 12 HOURS PRN
Status: DISCONTINUED | OUTPATIENT
Start: 2023-05-14 | End: 2023-05-14 | Stop reason: HOSPADM

## 2023-05-14 RX ORDER — AMOXICILLIN 250 MG
1 CAPSULE ORAL 2 TIMES DAILY
Status: DISCONTINUED | OUTPATIENT
Start: 2023-05-14 | End: 2023-05-14 | Stop reason: HOSPADM

## 2023-05-14 RX ORDER — HYDROCORTISONE 25 MG/G
CREAM TOPICAL 3 TIMES DAILY PRN
Status: DISCONTINUED | OUTPATIENT
Start: 2023-05-14 | End: 2023-05-14 | Stop reason: HOSPADM

## 2023-05-14 RX ORDER — LIDOCAINE 40 MG/G
CREAM TOPICAL
Status: DISCONTINUED | OUTPATIENT
Start: 2023-05-14 | End: 2023-05-14 | Stop reason: HOSPADM

## 2023-05-14 RX ORDER — OXYCODONE HYDROCHLORIDE 5 MG/1
5-10 TABLET ORAL EVERY 6 HOURS PRN
Qty: 20 TABLET | Refills: 0 | Status: SHIPPED | OUTPATIENT
Start: 2023-05-14 | End: 2023-06-01

## 2023-05-14 RX ORDER — PRENATAL VIT/IRON FUM/FOLIC AC 27MG-0.8MG
1 TABLET ORAL DAILY
Status: DISCONTINUED | OUTPATIENT
Start: 2023-05-14 | End: 2023-05-14 | Stop reason: HOSPADM

## 2023-05-14 RX ORDER — BISACODYL 10 MG
10 SUPPOSITORY, RECTAL RECTAL DAILY PRN
Status: DISCONTINUED | OUTPATIENT
Start: 2023-05-15 | End: 2023-05-14 | Stop reason: HOSPADM

## 2023-05-14 RX ORDER — METOCLOPRAMIDE HYDROCHLORIDE 5 MG/ML
10 INJECTION INTRAMUSCULAR; INTRAVENOUS EVERY 6 HOURS PRN
Status: DISCONTINUED | OUTPATIENT
Start: 2023-05-14 | End: 2023-05-14 | Stop reason: HOSPADM

## 2023-05-14 RX ORDER — DEXTROSE, SODIUM CHLORIDE, SODIUM LACTATE, POTASSIUM CHLORIDE, AND CALCIUM CHLORIDE 5; .6; .31; .03; .02 G/100ML; G/100ML; G/100ML; G/100ML; G/100ML
INJECTION, SOLUTION INTRAVENOUS CONTINUOUS
Status: DISCONTINUED | OUTPATIENT
Start: 2023-05-14 | End: 2023-05-14 | Stop reason: HOSPADM

## 2023-05-14 RX ORDER — MODIFIED LANOLIN
OINTMENT (GRAM) TOPICAL
Status: DISCONTINUED | OUTPATIENT
Start: 2023-05-14 | End: 2023-05-14 | Stop reason: HOSPADM

## 2023-05-14 RX ORDER — IBUPROFEN 800 MG/1
800 TABLET, FILM COATED ORAL EVERY 8 HOURS PRN
Qty: 30 TABLET | Refills: 0 | Status: SHIPPED | OUTPATIENT
Start: 2023-05-14 | End: 2023-06-01

## 2023-05-14 RX ORDER — MISOPROSTOL 100 UG/1
400 TABLET ORAL
Status: DISCONTINUED | OUTPATIENT
Start: 2023-05-14 | End: 2023-05-14 | Stop reason: HOSPADM

## 2023-05-14 RX ORDER — KETOROLAC TROMETHAMINE 30 MG/ML
30 INJECTION, SOLUTION INTRAMUSCULAR; INTRAVENOUS EVERY 6 HOURS
Status: COMPLETED | OUTPATIENT
Start: 2023-05-14 | End: 2023-05-14

## 2023-05-14 RX ORDER — AMOXICILLIN 250 MG
1 CAPSULE ORAL 2 TIMES DAILY PRN
Qty: 20 TABLET | Refills: 0 | Status: SHIPPED | OUTPATIENT
Start: 2023-05-14 | End: 2023-06-01

## 2023-05-14 RX ADMIN — KETOROLAC TROMETHAMINE 30 MG: 30 INJECTION, SOLUTION INTRAMUSCULAR; INTRAVENOUS at 01:59

## 2023-05-14 RX ADMIN — ACETAMINOPHEN 975 MG: 325 TABLET ORAL at 11:33

## 2023-05-14 RX ADMIN — DOCUSATE SODIUM 50 MG AND SENNOSIDES 8.6 MG 2 TABLET: 8.6; 5 TABLET, FILM COATED ORAL at 11:33

## 2023-05-14 RX ADMIN — CITALOPRAM HYDROBROMIDE 20 MG: 20 TABLET ORAL at 11:33

## 2023-05-14 RX ADMIN — KETOROLAC TROMETHAMINE 30 MG: 30 INJECTION, SOLUTION INTRAMUSCULAR; INTRAVENOUS at 08:24

## 2023-05-14 RX ADMIN — PRENATAL VIT W/ FE FUMARATE-FA TAB 27-0.8 MG 1 TABLET: 27-0.8 TAB at 11:33

## 2023-05-14 RX ADMIN — KETOROLAC TROMETHAMINE 30 MG: 30 INJECTION, SOLUTION INTRAMUSCULAR; INTRAVENOUS at 14:42

## 2023-05-14 RX ADMIN — ACETAMINOPHEN 975 MG: 325 TABLET ORAL at 05:10

## 2023-05-14 RX ADMIN — ACETAMINOPHEN 975 MG: 325 TABLET ORAL at 17:36

## 2023-05-14 RX ADMIN — SODIUM CHLORIDE, SODIUM LACTATE, POTASSIUM CHLORIDE, CALCIUM CHLORIDE AND DEXTROSE MONOHYDRATE: 5; 600; 310; 30; 20 INJECTION, SOLUTION INTRAVENOUS at 00:11

## 2023-05-14 ASSESSMENT — ACTIVITIES OF DAILY LIVING (ADL)
ADLS_ACUITY_SCORE: 20

## 2023-05-14 NOTE — OP NOTE
OB  Delivery Note        Raina Javier MRN# [id number]   Age: 33 year old YOB: 1990         GA: 40w3d  GP:   Labor Complications: Cephalopelvic Disproportion;Fetal Intolerance   EBL:   mL  Delivery QBL:    Delivery Type: , Low Transverse   ROM to Delivery Time: rupture date or rupture time have not been documented       1 Minute 5 Minute 10 Minute   Apgar Totals:            Personel Present:        Details     Pre-Op Diagnosis: 1. Intrauterine pregnancy at 40w3d  2. Failure to descend in second stage after 2 hours and 30 minutes of pushing at +1/3 station  3. Fetal tachycardia  4. GBS pos with adequate prophylaxis during labor  5. Category 2 tracing   Post-Op Diagnosis: 1. Same   Indications:  Fetal intolerance;Arrest of descent    Procedure:   , Low Transverse  via   Pfannenstiel incision   Anesthesia:  Spinal      Informed Consent:  The risks, benefits, complications, and alternatives were discussed with the patient. The patient understood that the risks of  section include, but are not limited to: injury to nearby structures or organs, infection, blood loss and possible need for transfusion, and potential need for more surgery including hysterectomy. The patient stated understanding and desired to proceed. All questions were answered. The site of surgery was properly noted and marked. The patient was identified as Raina Javier and the procedure verified as a  delivery. A Time Out was held and the above information confirmed.     Procedure Details:  DESCRIPTION OF PROCEDURE:     The patient was transferred to the OR where spinal anesthesia was accomplished.  A llanos catheter was inserted and clear urine was noted. Fetal heart rate auscultated with Doppler in the 140's  The abdomen was scrubbed prepped and draped inthe usual manner.  A hard stop timeout was accomplished.  A transverse Pfannenstiel incision was made and  sharp and electrocautery dissection carried down to the fascial layer.  The Fascia was opened in the midline andextended bluntly bilaterally.  The rectus muscles were  in the midline bluntly.  The peritoneum was bluntly divided and the Annika ring retractor was placed.  A bladder flap was made sharply.  The lower uterine segment was incised sharply in a low transverse fashion and the amniotic sac ruptured bluntly with a finger.  Meconium stained fluid was noted and the incision extended bluntly.  The fetal head was deliverd in the OA position. Nuchal cord x 1 was noted and reduced. The shoulders delivered atraumatically followed by the remainder of the baby.   The cord was clamped and cut and the infant handed to Dr. Villegas who was present at my request due to an urgent  with Cat 2 tracing.The placenta was manually removed and the uterus wiped clear of clots and debris. Pitocin was added to the IV infusion. The initial tone in the uterus was poor and I requested methergine IM and tranexamic acid at that point. The tubes and ovaries appeared normal. The uterine incision was closed in a double layer of #1 Chromic in a running fashion. Hemostasis was adequate. Uterine tone was still poor and I requested hemabate 0.25 mg IM and got an additional syringe of pitocin at 10 Units and was brought to the field sterile and administered IM within the uterus. Tone improved after another 30 sec. The abdomen was irrigated clear of clots and debris.  The peritoneum and the rectus  muscles were approximated with  3 0 vicryl.   The fascia was closed with 0 PDS.  The subcutaneous layer was irrigated and made hemastatic with electrocautery.  It was closed in a single layer of 3 0 vicryl. The skin was closed with Insorb staples.  The wound was dressed with steri-strips and a pressure dressing. I then performed a bimanual exam and massaged out an additional 200 ml of blood and small clots. Tone was again good, and cytotec  800 mcg was given HI.  Counts were correct times 2.  The patient was transferred to the recovery room in stable condition. Baby is in the Nursery for ongoing evaluation.     Dawson Gomez MD FACOG  9:17 PM 2023         Labor Events        Connor JavierLynetteRaina [[id number]]    Rupture date/time: 23     Rupture type: Artificial Rupture of Membranes  Fluid color: Clear      Induction: Misoprostol  Induction date/time:         Cervical ripening date/time:         Indications for induction: Post-term Gestation, Gestational Hypertension      Augmentation: Oxytocin  Indications for augmentation: Ineffective Contraction Pattern                  Delivery/Placenta Date and Time          Delivery Date: 23 Delivery Time:  8:21 PM               Cord    Cord Complications: Nuchal   Nuchal Intervention: reduced   Nuchal cord description: loose nuchal cord   Stem cell collection?: No                       Labor Events and Shoulder Dystocia    Fetal Tracing Prior to Delivery: Category 2  Fetal Tracing Comments: fetal tachycardia with decelerations with pushing  Shoulder dystocia present?: Neg                    Delivery (Maternal) (Provider to Complete) ([id number])                      Blood Loss  Mother: Raina Javier #[id number]          Start of Mother's Information           Delivery Blood Loss  23 2020 - 23 2115            Total Surgical QBL Blood Loss (mL) Hospital Encounter 784 mL     Total   784 mL                          End of Mother's Information  Mother: Raina Javier #[id number]                     Delivery - Provider to Complete ([id number])    Delivery Type (Choose the 1 that will go to the Birth History): , Low Transverse      Priority: Urgent    Specifics: Primary nulliparous   Indications for : Fetal intolerance, Arrest of descent                                    Placenta    Removal: Manual Removal  Disposition:  Pathology              Presentation and Position    Presentation: Vertex     Position: Left Occiput Anterior                       Dawson Gomez MD

## 2023-05-14 NOTE — DISCHARGE INSTRUCTIONS
Warning Signs after Having a Baby    Keep this paper on your fridge or somewhere else where you can see it.    Call your provider if you have any of these symptoms up to 12 weeks after having your baby.    Thoughts of hurting yourself or your baby  Pain in your chest or trouble breathing  Severe headache not helped by pain medicine  Eyesight concerns (blurry vision, seeing spots or flashes of light, other changes to eyesight)  Fainting, shaking or other signs of a seizure    Call 9-1-1 if you feel that it is an emergency.     The symptoms below can happen to anyone after giving birth. They can be very serious. Call your provider if you have any of these warning signs.    My provider s phone number: _______________________    Losing too much blood (hemorrhage)    Call your provider if you soak through a pad in less than an hour or pass blood clots bigger than a golf ball. These may be signs that you are bleeding too much.    Blood clots in the legs or lungs    After you give birth, your body naturally clots its blood to help prevent blood loss. Sometimes this increased clotting can happen in other areas of the body, like the legs or lungs. This can block your blood flow and be very dangerous.     Call your provider if you:  Have a red, swollen spot on the back of your leg that is warm or painful when you touch it.   Are coughing up blood.     Infection    Call your provider if you have any of these symptoms:  Fever of 100.4 F (38 C) or higher.  Pain or redness around your stitches if you had an incision.   Any yellow, white, or green fluid coming from places where you had stitches or surgery.    Mood Problems (postpartum depression)    Many people feel sad or have mood changes after having a baby. But for some people, these mood swings are worse.     Call your provider right away if you feel so anxious or nervous that you can't care for yourself or your baby.    Preeclampsia (high blood pressure)    Even if you  didn't have high blood pressure when you were pregnant, you are at risk for the high blood pressure disease called preeclampsia. This risk can last up to 12 weeks after giving birth.     Call your provider if you have:   Pain on your right side under your rib cage  Sudden swelling in the hands and face    Remember: You know your body. If something doesn't feel right, get medical help.     For informational purposes only. Not to replace the advice of your health care provider. Copyright 2020 [facility]. All rights reserved. Clinically reviewed by Lolita Biggs, RNC-OB, MSN. [employer] [id number] - Rev 02/23.

## 2023-05-14 NOTE — PROGRESS NOTES
Patient pumping every 2-3 hours for infant collecting approximately 2-3 mL per pumping session at this time. Maternal expressed breast milk collected in syringes and labeled with patient name, date and time of pump and placed in the patients fridge.

## 2023-05-14 NOTE — ANESTHESIA PROCEDURE NOTES
"Intrathecal injection Procedure Note    Pre-Procedure   Staff -        CRNA: Eliseo Broussard APRN CRNA       Other Anesthesia Staff: Tri Urena APRN CRNA       Performed By: CRNA and with CRNAs       Location: OR       Procedure Start/Stop Times: 2023 8:04 PM and 2023 8:09 PM       Pre-Anesthestic Checklist: patient identified, IV checked, risks and benefits discussed, informed consent, monitors and equipment checked, pre-op evaluation, at physician/surgeon's request and post-op pain management  Timeout:       Correct Patient: Yes        Correct Procedure: Yes        Correct Site: Yes        Correct Position: Yes   Procedure Documentation  Procedure: intrathecal injection       Diagnosis:        Patient Position: sitting       Patient Prep/Sterile Barriers: sterile gloves, mask, patient draped       Skin prep: Chloraprep       Insertion Site: L3-4. (midline approach).       Needle Gauge: 25.        Needle Length (Inches): 3.5        Introducer used       # of attempts: 1 and  # of redirects:  0    Assessment/Narrative         Paresthesias: No.       LP fluid removal amount (ml): 0       CSF fluid: clear.       Opening pressure was cmH2O while  Sitting.      Medication(s) Administered   0.75% Hyperbaric Bupivacaine (Intrathecal) - Intrathecal   1.6 mL - 2023 8:09:00 PM  Morphine PF 1 mg/mL (Intrathecal) - Intrathecal   0.1 mg - 2023 8:09:00 PM  Medication Administration Time: 2023 8:04 PM      FOR [facility] ([facility]) ONLY:   Pain Team Contact information: please page the Pain Team Via [employer]. Search \"Pain\". During daytime hours, please page the attending first. At night please page the resident first.      "

## 2023-05-14 NOTE — OR NURSING
Birth of viable baby boy @ 2021.  Cord blood labeled and given to PAWAN RN, UMBILICAL CORD CLAMPED AND SENT WITH  FOR BLOOD GASES.   Marybeth Archuleta RN on 5/13/2023 at 8:55 PM

## 2023-05-14 NOTE — PROGRESS NOTES
Patient continues pushing efforts with every contraction at this time, approximately every 3 minutes. Infants baseline HR appears to be increasing to appropriately 150s at this time. Per MD restart oxytocin at 10 mu as contractions have spaced to approximately every 3-5 minutes at 1802.

## 2023-05-14 NOTE — OR NURSING
"PACU Transfer of Care Note    Raina Javier's care was transferred to [place] at 2220.  Equipment used for transport: NA    PACU Respiratory Event Documentation     1) Episodes of Apnea greater than or equal to 10 seconds: none     2) Bradypnea - less than 8 breaths per minute: 16-18    3) Pain score on 0 to 10 scale: starting to feel abdomin \"a little\"    4) Pain-sedation mismatch (yes or no): no    5) Repeated 02 desaturation less than 90% (yes or no): no    Anesthesia notified? (yes or no): yes, CRNA aware of blood pressures elevation in PACU period.  No intervention at this time, will pass along in report and will continue to monitor.     Any of the above events occuring repeatedly in separate 30 minute intervals may be considered recurrent PACU respiratory events.    Nurse aware and will continue to monitor BP and anxiety level.  Patient stable and meets phase 1 discharge criteria for transport from PACU. Nat Hadley RN on 5/13/2023 at 10:26 PM      "

## 2023-05-14 NOTE — PROGRESS NOTES
Patient began pushing 1640, coaching provided with good effort. Babe tolerating efforts well with initial pushing efforts. Decelerations noted to the 80s-90s with recovery into the 120a-130s. RN at the bedside continuously with pushing efforts.

## 2023-05-14 NOTE — PROGRESS NOTES
Oxytocin infusion stopped for continued decelerations to the 80s, IV bolus 250mL initiated. Patient began pushing with every other contraction for the next 4-5 contractions. MD and RN at bedside at this time for continuous monitoring during pushing.

## 2023-05-14 NOTE — L&D DELIVERY NOTE
OB  Delivery Note      Raina Javier MRN# [id number]   Age: 33 year old YOB: 1990       GA: 40w3d  GP:   Labor Complications: Cephalopelvic Disproportion;Fetal Intolerance   EBL: 900   mL  Delivery QBL:    Delivery Type: , Low Transverse   ROM to Delivery Time: rupture date or rupture time have not been documented     1 Minute 5 Minute 10 Minute   Apgar Totals: 3   4   4          Details    Pre-Op Diagnosis: 1. Intrauterine pregnancy at 40w3d  2. Failure to descend in second stage after 2 hours and 30 minutes of pushing at +1/3 station  3. Fetal tachycardia  4. GBS pos with adequate prophylaxis during labor  5. Category 2 tracing   Post-Op Diagnosis: 1. Same   Indications:  Fetal intolerance;Arrest of descent    Procedure:   , Low Transverse  via   Pfannenstiel incision   Anesthesia: Epidural;Spinal Spinal     Informed Consent:  The risks, benefits, complications, and alternatives were discussed with the patient. The patient understood that the risks of  section include, but are not limited to: injury to nearby structures or organs, infection, blood loss and possible need for transfusion, and potential need for more surgery including hysterectomy. The patient stated understanding and desired to proceed. All questions were answered. The site of surgery was properly noted and marked. The patient was identified as Raina Javier and the procedure verified as a  delivery. A Time Out was held and the above information confirmed.    Procedure Details:  DESCRIPTION OF PROCEDURE:     The patient was transferred to the OR where spinal anesthesia was accomplished.  A llanos catheter was inserted and clear urine was noted. Fetal heart rate auscultated with Doppler in the 140's  The abdomen was scrubbed prepped and draped inthe usual manner.  A hard stop timeout was accomplished.  A transverse Pfannenstiel incision was made and sharp  and electrocautery dissection carried down to the fascial layer.  The Fascia was opened in the midline andextended bluntly bilaterally.  The rectus muscles were  in the midline bluntly.  The peritoneum was bluntly divided and the Annika ring retractor was placed.  A bladder flap was made sharply.  The lower uterine segment was incised sharply in a low transverse fashion and the amniotic sac ruptured bluntly with a finger.  Meconium stained fluid was noted and the incision extended bluntly.  The fetal head was deliverd in the OA position. Nuchal cord x 1 was noted and reduced. The shoulders delivered atraumatically followed by the remainder of the baby.   The cord was clamped and cut and the infant handed to Dr. Villegas who was present at my request due to an urgent  with Cat 2 tracing. An 8 cm segment of cord was clamped off and cut and handed off separately for cord gases. The placenta was manually removed and the uterus wiped clear of clots and debris. Pitocin was added to the IV infusion. The initial tone in the uterus was poor and I requested methergine IM and tranexamic acid at that point. The tubes and ovaries appeared normal. The uterine incision was closed in a double layer of #1 Chromic in a running fashion. Hemostasis was adequate. Uterine tone was still poor and I requested hemabate 0.25 mg IM and got an additional syringe of pitocin at 10 Units and was brought to the field sterile and administered IM within the uterus. Tone improved after another 30 sec. The abdomen was irrigated clear of clots and debris.  The peritoneum and the rectus  muscles were approximated with  3 0 vicryl.   The fascia was closed with 0 PDS.  The subcutaneous layer was irrigated and made hemastatic with electrocautery.  It was closed in a single layer of 3 0 vicryl. The skin was closed with Insorb staples.  The wound was dressed with steri-strips and a pressure dressing. I then performed a bimanual exam and massaged  out an additional 200 ml of blood and small clots. Tone was again good, and cytotec 800 mcg was given [place].  Counts were correct times 2.  The patient was transferred to the recovery room in stable condition. Baby is in the Nursery for ongoing evaluation. Post-delivery I was informed that the cord blood was unable to be run for ABG's on the baby.    Dawson Gomez MD FACOG  9:17 PM 2023       Cesar Javier [[id number]]    Labor Events     labor?: No   steroids: None  Labor Type: Induction/Cervical ripening  Predominate monitoring during 1st stage: continuous electronic fetal monitoring     Antibiotics received during labor?: Yes  Reason for Antibiotics: GBS  Antibiotics received for GBS: Penicillin     Rupture date/time: 23    Rupture type: Artificial Rupture of Membranes  Fluid color: Clear     Induction: Misoprostol  Induction date/time:      Cervical ripening date/time: 23     Indications for induction: Post-term Gestation, Gestational Hypertension     Augmentation: Oxytocin  Indications for augmentation: Ineffective Contraction Pattern     Delivery/Placenta Date and Time    Delivery Date: 23 Delivery Time:  8:21 PM   Oxytocin given at the time of delivery: after delivery of baby          Apgars    Living status: Living   1 Minute 5 Minute 10 Minute 15 Minute 20 Minute   Skin color: 0  1  1      Heart rate: 2  2  2      Reflex irritability: 0  0  0      Muscle tone: 0  0  0      Respiratory effort: 1  1  1      Total: 3  4  4      Apgars assigned by: TODD AMADOR     Cord    Cord Complications: Nuchal   Nuchal Intervention: reduced   Nuchal cord description: loose nuchal cord   Stem cell collection?: No                     Labor Events and Shoulder Dystocia    Fetal Tracing Prior to Delivery: Category 2  Fetal Tracing Comments: fetal tachycardia with decelerations with pushing  Shoulder dystocia present?: Neg     Delivery (Maternal) (Provider to Complete) ([id number])        Blood Loss  Mother: Raina Javier #[id number]   Start of Mother's Information    Delivery Blood Loss  23 - 23 2142    Total Surgical QBL Blood Loss (mL) Hospital Encounter 784 mL    Total  784 mL         End of Mother's Information  Mother: Raina Javier #[id number]          Delivery - Provider to Complete ([id number])    Delivery Type (Choose the 1 that will go to the Birth History): , Low Transverse     Priority: Urgent    Specifics: Primary nulliparous   Indications for : Fetal intolerance, Arrest of descent                                Placenta    Removal: Manual Removal  Disposition: Pathology           Anesthesia    Method: Epidural, Spinal  Cervical dilation at placement: 0-3                Presentation and Position    Presentation: Vertex    Position: Left Occiput Anterior                 Dawson Gomez MD

## 2023-05-14 NOTE — PROGRESS NOTES
Patient continues with pushing every 2-3 minutes. Infant heart rate decelerating to the upper 70s/80s and returning to mid 140s. MD and RN at the bedside reviewing strip with pushing efforts.

## 2023-05-14 NOTE — DISCHARGE SUMMARY
[facility]    Discharge Summary  Obstetrics    Date of Admission:  2023  Date of Discharge:  2023  Discharging Provider: Dawson Gomez MD    Discharge Diagnoses   Cephalopelvic disproportion due to outlet contraction of pelvis, single or unspecified fetus [O33.3XX0]    Patient Active Problem List   Diagnosis     Selective IgA Deficiency     Acne     Deviated Nasal Septum (Acquired)     Chronic Maxillary Sinusitis     Chronic Frontal Sinusitis     Chronic Ethmoidal Sinusitis     Chronic Sphenoidal Sinusitis     Asthma     Normal labor and delivery     S/P  section     Cephalopelvic disproportion due to generally contracted pelvis       Procedure/Surgery Information   Procedure: Procedure(s):   SECTION   Surgeon(s): Surgeon(s) and Role:     * Dawson Gomez MD - Primary     History of Present Illness   Raina Javier is a 33 year old female who presented with labor induction for sIgA deficiency and potential need for washed blood cells, preeclampsia, and 40w1d gestation. She had an uncomplicated , with postpartum atony, and her  required unanticipated resuscitation and was transferred to NICU.    Hospital Course   The patient's hospital course was unremarkable.  She recovered as anticipated and experienced no post-operative complications.  On discharge, her pain was well controlled. Vaginal bleeding is similar to peak menstrual flow.  Voiding without difficulty.  Ambulating well and tolerating a normal diet.  No fever or significant wound drainage.  Breastfeeding well.  Infant is stable.  She was discharged on post-partum day #1.    Post-partum hemoglobin:   Hemoglobin   Date Value Ref Range Status   2023 12.4 11.7 - 15.7 g/dL Final       Dawsno Gomez MD    Discharge Disposition   Discharged to home   Condition at discharge: Stable    Pending Results   Final pathology results: Pending at time of discharge  These results will be  followed up by Dawson Gomez MD    Unresulted Labs Ordered in the Past 30 Days of this Admission     Date and Time Order Name Status Description    2023  8:39 AM Prepare red blood cells (unit) Preliminary     2023  8:39 AM Prepare red blood cells (unit) Preliminary           Primary Care Physician   Physician No Ref-Primary    Consultations This Hospital Stay   LACTATION IP CONSULT    Discharge Orders      Activity    Activity as tolerated     Reason for your hospital stay    Maternity care     Follow Up    Follow up with provider in 2 weeks and 6 weeks for post-delivery checks     Breast pump    Breast Pump Documentation:  Manual/Electric Pump: To support adequate breast milk production and nutrition for infant.     I, the undersigned, certify that the above prescribed supplies are medically necessary for this patient and is both reasonable and necessary in reference to accepted standards of medical and necessary in reference to accepted standards of medical practice in the treatment of this patient's condition and is not prescribed as a convenience.     Diet    Resume previous diet     Discharge Medications   Current Discharge Medication List      START taking these medications    Details   ibuprofen (ADVIL/MOTRIN) 800 MG tablet Take 1 tablet (800 mg) by mouth every 8 hours as needed for moderate pain  Qty: 30 tablet, Refills: 0    Associated Diagnoses: S/P  section      oxyCODONE (ROXICODONE) 5 MG tablet Take 1-2 tablets (5-10 mg) by mouth every 6 hours as needed for moderate to severe pain (max six tabs per day)  Qty: 20 tablet, Refills: 0    Associated Diagnoses: S/P  section      senna-docusate (SENOKOT-S/PERICOLACE) 8.6-50 MG tablet Take 1 tablet by mouth 2 times daily as needed for constipation  Qty: 20 tablet, Refills: 0    Associated Diagnoses: S/P  section         CONTINUE these medications which have CHANGED    Details   citalopram (CELEXA) 20 MG tablet Take 1 tablet  (20 mg) by mouth daily  Qty: 90 tablet, Refills: 3    Associated Diagnoses: Adjustment disorder with anxious mood         CONTINUE these medications which have NOT CHANGED    Details   Prenatal Vit-Fe Fumarate-FA (PRENATAL MULTIVITAMIN W/IRON) 27-0.8 MG tablet Take 1 tablet by mouth daily           Allergies   Allergies   Allergen Reactions     Gentamycin [Gentamicin Sulfate]      Eye drops, swelling of the eyes and redness     Loracarbef Rash

## 2023-05-14 NOTE — PROGRESS NOTES
[facility]    Obstetrics Post-Op / Progress Note    Assessment & Plan   Assessment:  -1 Day Post-Op  Procedure(s):   SECTION    Doing well.  Clean wound without signs of infection.  No immediate surgical complications identified.    Plan:  Pain control measures as needed  Discharge later today for mom to travel to NICU to be with baby  Dawson Gomez MD     Interval History   Doing well.  Pain is well-controlled.  No fevers.  No history of wound drainage, warmth or significant erythema.  Good appetite.  Denies chest pain, shortness of breath, nausea or vomiting.  Ambulatory.  Pumping colostrum to encourage lactation.    Medications     dextrose 5% lactated ringers 125 mL/hr at 23 0011     - MEDICATION INSTRUCTIONS -       oxytocin in 0.9% NaCl       oxytocin in 0.9% NaCl         acetaminophen  975 mg Oral Q6H     ibuprofen  800 mg Oral Q6H     ketorolac  30 mg Intravenous Q6H     senna-docusate  1 tablet Oral BID    Or     senna-docusate  2 tablet Oral BID     sodium chloride (PF)  3 mL Intracatheter Q8H     Tdap (tetanus-diphtheria-acell pertussis)  0.5 mL Intramuscular Once       Physical Exam   Temp: (!) 96.1  F (35.6  C) Temp src: Temporal BP: 135/78 Pulse: 76   Resp: 16 SpO2: 97 %      Vitals:    23 1742   Weight: 88.9 kg (196 lb)     Vital Signs with Ranges  Temp:  [96.1  F (35.6  C)-99.6  F (37.6  C)] 96.1  F (35.6  C)  Pulse:  [67-93] 76  Resp:  [6-32] 16  BP: (118-183)/() 135/78  SpO2:  [86 %-100 %] 97 %  I/O last 3 completed shifts:  In: 1000 [P.O.:500; I.V.:500]  Out: 2734 [Urine:1875; Emesis/NG output:75; Blood:784]    Uterine fundus is firm, non-tender and at the level of the umbilicus  Incision C/D/I  Extremities Non-tender    Data   Recent Labs   Lab Test 23  1751   AS Negative     Recent Labs   Lab Test 23  2118 23  1258   HGB 13.0 12.4     Recent Labs   Lab Test 10/13/22  0735   RUQIGG Positive     Lab Results   Component Value Date     WBC 17.1 05/14/2023     Lab Results   Component Value Date    RBC 3.62 05/14/2023     Lab Results   Component Value Date    HGB 11.2 05/14/2023     Lab Results   Component Value Date    HCT 32.0 05/14/2023     Lab Results   Component Value Date    MCV 88 05/14/2023     Lab Results   Component Value Date    MCH 30.9 05/14/2023     Lab Results   Component Value Date    MCHC 35.0 05/14/2023     Lab Results   Component Value Date    RDW 12.5 05/14/2023     Lab Results   Component Value Date     05/14/2023     Last Comprehensive Metabolic Panel:  Sodium   Date Value Ref Range Status   05/14/2023 135 (L) 136 - 145 mmol/L Final     Potassium   Date Value Ref Range Status   05/14/2023 4.5 3.4 - 5.3 mmol/L Final     Chloride   Date Value Ref Range Status   05/14/2023 105 98 - 107 mmol/L Final     Carbon Dioxide (CO2)   Date Value Ref Range Status   05/14/2023 22 22 - 29 mmol/L Final     Anion Gap   Date Value Ref Range Status   05/14/2023 8 7 - 15 mmol/L Final     Glucose   Date Value Ref Range Status   05/14/2023 103 (H) 70 - 99 mg/dL Final     GLUCOSE BY METER POCT   Date Value Ref Range Status   05/13/2023 109 (H) 70 - 99 mg/dL Final     Urea Nitrogen   Date Value Ref Range Status   05/14/2023 11.7 6.0 - 20.0 mg/dL Final     Creatinine   Date Value Ref Range Status   05/14/2023 0.81 0.51 - 0.95 mg/dL Final     GFR Estimate   Date Value Ref Range Status   05/14/2023 >90 >60 mL/min/1.73m2 Final     Comment:     eGFR calculated using 2021 CKD-EPI equation.     Calcium   Date Value Ref Range Status   05/14/2023 8.7 8.6 - 10.0 mg/dL Final     Bilirubin Total   Date Value Ref Range Status   05/14/2023 0.2 <=1.2 mg/dL Final     Alkaline Phosphatase   Date Value Ref Range Status   05/14/2023 146 (H) 35 - 104 U/L Final     ALT   Date Value Ref Range Status   05/14/2023 17 10 - 35 U/L Final     AST   Date Value Ref Range Status   05/14/2023 31 10 - 35 U/L Final

## 2023-05-14 NOTE — PLAN OF CARE
[facility] DISCHARGE NOTE    Patient discharged to home at 5:45 PM via ambulation. Accompanied by mother and father and staff. Discharge instructions reviewed with patient and caregiver, opportunity offered to ask questions. Prescriptions sent to patients preferred pharmacy. All belongings sent with patient.    Shereen Israel RN

## 2023-05-14 NOTE — PROGRESS NOTES
Patient continues pushing efforts with each contraction, different pushing positions attempted (throne, utilizing bar, stirups). Patient states feeling most natural pushing in throne position. Perineal swelling increasing at this time. FHT baseline increased to mid 160s at this time with decelerations noted to the 120s with each contraction/pushing effort. Bedside report given to TODD Thao.

## 2023-05-14 NOTE — ANESTHESIA POSTPROCEDURE EVALUATION
Patient: Raina Javier    Procedure: Procedure(s):   SECTION       Anesthesia Type:  Epidural    Note:  Disposition: Inpatient   Postop Pain Control: Uneventful            Sign Out: Well controlled pain   PONV: No   Neuro/Psych: Uneventful            Sign Out: Acceptable/Baseline neuro status   Airway/Respiratory: Uneventful            Sign Out: Acceptable/Baseline resp. status   CV/Hemodynamics: Uneventful            Sign Out: Acceptable CV status; No obvious hypovolemia; No obvious fluid overload   Other NRE: NONE   DID A NON-ROUTINE EVENT OCCUR? No     Epidural-to- Updated ASA: 3 Emergent      Last vitals:  Vitals Value Taken Time   /80 23   Temp 98.9  F (37.2  C) 23   Pulse 72 23   Resp 18 23   SpO2 96 % 23   Vitals shown include unvalidated device data.    Electronically Signed By: FRANK Song CRNA  May 13, 2023  9:49 PM

## 2023-05-14 NOTE — ANESTHESIA CARE TRANSFER NOTE
Patient: Raina Javier    Procedure: Procedure(s):   SECTION       Diagnosis: Cephalopelvic disproportion due to outlet contraction of pelvis, single or unspecified fetus [O33.3XX0]  Diagnosis Additional Information: No value filed.    Anesthesia Type:   Epidural     Note:    Oropharynx: oropharynx clear of all foreign objects and spontaneously breathing  Level of Consciousness: awake  Oxygen Supplementation: room air    Independent Airway: airway patency satisfactory and stable  Dentition: dentition unchanged  Vital Signs Stable: post-procedure vital signs reviewed and stable  Report to RN Given: handoff report given  Patient transferred to: Labor and Delivery    Handoff Report: Identifed the Patient, Identified the Reponsible Provider, Reviewed the pertinent medical history, Discussed the surgical course, Reviewed Intra-OP anesthesia mangement and issues during anesthesia, Set expectations for post-procedure period and Allowed opportunity for questions and acknowledgement of understanding      Vitals:  Vitals Value Taken Time   /80 23 2145   Temp 98.9  F (37.2  C) 23 2130   Pulse 72 23 2147   Resp 18 23 214   SpO2 96 % 23   Vitals shown include unvalidated device data.    Electronically Signed By: FRANK Song CRNA  May 13, 2023  9:48 PM

## 2023-05-14 NOTE — PLAN OF CARE
VSS - BP's have been up and down due to emotions and stress, afebrile, pain controlled with tap block and scheduled meds.  Pt was at bedside at 0215.  She complained of dizziness at this time, so was not ambulated around the room or to the bathroom.  [place] will remain in place until she is more stable on her feet.  Draining well - clear, pale, yellow urine.  Fundus is firm, midline, at the U.  Bleeding scant to light with no clots noted.  Pt pumped at 0245 and got 2 mL of colostrum.  This was labeled and placed in her fridge.  Received a phone call from the  and was updated on her baby.  She visibly relaxed after this and is currently sleeping soundly in bed.  Keesha Tom RN............................ 5/14/2023 4:00 AM

## 2023-05-14 NOTE — PROGRESS NOTES
Living male born via  at .  Baby was flacid and blue upon coming to the warmer.  In need of CPAP with O2 and suciton.  O2 sats slowly came to normal for age. Temperature remained stable.  Tele-NICU paged at   Voided and stooled right after delivery.

## 2023-05-17 ENCOUNTER — LACTATION ENCOUNTER (OUTPATIENT)
Age: 33
End: 2023-05-17

## 2023-05-17 NOTE — LACTATION NOTE
This note was copied from a baby's chart.  D: Called to nursery to assist with baby's feeding.  Upon arrival, baby had latched successfully, and  had success in re-latching on the same side once he let go.  We discussed supportive hold, positioning, latch, breastfeeding patterns and infant driven feeding, breast support and compressions,  skin to skin benefits, and timing of pumpings around breastfeedings. Baby latched for 20 minutes.    P: Will continue to assist as needed.        Mary Mejia RN, IBCLC   Lactation Consultant  Ascom: *[phone]  Office: [phone]

## 2023-05-18 LAB
PATH REPORT.COMMENTS IMP SPEC: NORMAL
PATH REPORT.FINAL DX SPEC: NORMAL
PATH REPORT.RELEVANT HX SPEC: NORMAL
PHOTO IMAGE: NORMAL

## 2023-06-01 ENCOUNTER — OFFICE VISIT (OUTPATIENT)
Dept: OBGYN | Facility: OTHER | Age: 33
End: 2023-06-01
Attending: OBSTETRICS & GYNECOLOGY
Payer: COMMERCIAL

## 2023-06-01 VITALS
WEIGHT: 167 LBS | DIASTOLIC BLOOD PRESSURE: 72 MMHG | TEMPERATURE: 99.9 F | SYSTOLIC BLOOD PRESSURE: 130 MMHG | HEART RATE: 101 BPM | BODY MASS INDEX: 28.67 KG/M2

## 2023-06-01 DIAGNOSIS — D80.2 IGA DEFICIENCY (H): ICD-10-CM

## 2023-06-01 DIAGNOSIS — N61.0 MASTITIS: Primary | ICD-10-CM

## 2023-06-01 DIAGNOSIS — Z98.891 S/P CESAREAN SECTION: ICD-10-CM

## 2023-06-01 DIAGNOSIS — Z87.59 HISTORY OF PRE-ECLAMPSIA: ICD-10-CM

## 2023-06-01 PROCEDURE — 99024 POSTOP FOLLOW-UP VISIT: CPT | Performed by: OBSTETRICS & GYNECOLOGY

## 2023-06-01 RX ORDER — DICLOXACILLIN SODIUM 500 MG
500 CAPSULE ORAL 4 TIMES DAILY
Qty: 40 CAPSULE | Refills: 0 | Status: SHIPPED | OUTPATIENT
Start: 2023-06-01 | End: 2023-06-11

## 2023-06-01 ASSESSMENT — PAIN SCALES - GENERAL: PAINLEVEL: NO PAIN (0)

## 2023-06-01 NOTE — PROGRESS NOTES
CC: postpartum exam at 2 weeks from delivery    HPI: Raina Javier presents for postpartum exam. Baby was born by cesrean delivery. Complications included  abnormal tone with transfer to [place] for further cares. Baby was therapeuticaly cooled, but has had no demonstrable ill effects from delivery and is home and doing well. She developed preeclampsia a day before her induction and subsequent . Pathology revealed some evidence of developing infection on her placenta.    Mood: good    Breastfeeding: yes, noted fullness and tenderness in the left upper breast on the lateral side with body aches and low grade fever. She is breastfeeding hourly, and doesn't feel she is getting empty. She has history of sIGA deficiency.  Incision(s): healing well, closed and dry  Bleeding: minimally  Birthcontrol: considering options    Past Medical History:   Diagnosis Date     Acid reflux      Anxiety      Depression      Depressive disorder      Fructose intolerance      IgA deficiency (H)     Diagnosed in [facility]. NO blood trasnfusions with IgA- medical alert     Kidney stone      Uncomplicated asthma      Past Surgical History:   Procedure Laterality Date      SECTION N/A 2023    Procedure:  SECTION;  Surgeon: Dawson Gomez MD;  Location:  OR     SINUS SURGERY       Current Outpatient Medications   Medication     citalopram (CELEXA) 20 MG tablet     Prenatal Vit-Fe Fumarate-FA (PRENATAL MULTIVITAMIN W/IRON) 27-0.8 MG tablet     dicloxacillin (DYNAPEN) 500 MG capsule     No current facility-administered medications for this visit.      Allergies   Allergen Reactions     Gentamycin [Gentamicin Sulfate]      Eye drops, swelling of the eyes and redness     Loracarbef Rash       REVIEW OF SYSTEMS:  Neg for bowel, bladder, and breast    O: /72   Pulse 101   Temp 99.9  F (37.7  C)   Wt 75.8 kg (167 lb)   LMP 2022 (Exact Date)   Breastfeeding Yes   BMI  28.67 kg/m    Body mass index is 28.67 kg/m .    Exam:  Constitutional: healthy, alert, active and no distress  Left breast is firm on the left upper outer quadrant with no erythema, mildly tender  Abdomen: soft, NT, ND, incision well healed      No results found for any visits on 23.      I/P:  Postpartum visit.    (N61.0) Mastitis  (primary encounter diagnosis)  Comment:   Plan: dicloxacillin (DYNAPEN) 500 MG capsule QID x 10 days          (Z98.891) S/P  section    (Z87.59) History of pre-eclampsia    (D80.2) IgA deficiency (H)    Suspect evolving left mastitis vs engorgement  Recommend oral dicloxacillin qid x 10 days. Discussed recheck if not improving within 48 hours, possible abscess development if antibiotics are not effective. Recommend she pump her breasts to empty after baby finishes feeding.    Recheck in four weeks    Dawson Gomez MD FACOG  2:54 PM 2023       Answers for HPI/ROS submitted by the patient on 2023  What is the reason for your visit today? : post c section appt  How many servings of fruits and vegetables do you eat daily?: 2-3  On average, how many sweetened beverages do you drink each day (Examples: soda, juice, sweet tea, etc.  Do NOT count diet or artificially sweetened beverages)?: 3  How many minutes a day do you exercise enough to make your heart beat faster?: 20 to 29  How many days a week do you exercise enough to make your heart beat faster?: 6  How many days per week do you miss taking your medication?: 0

## 2023-06-01 NOTE — NURSING NOTE
"Chief Complaint   Patient presents with     Surgical Followup     2 week post op      Patient is here for 2 week post op, Patient is states that she feels achy on and off.   Initial /72   Pulse 101   Temp 99.9  F (37.7  C)   Wt 75.8 kg (167 lb)   LMP 08/03/2022 (Exact Date)   Breastfeeding Yes   BMI 28.67 kg/m   Estimated body mass index is 28.67 kg/m  as calculated from the following:    Height as of 5/12/23: 1.626 m (5' 4\").    Weight as of this encounter: 75.8 kg (167 lb).  Medication Reconciliation: complete          Cintia Barriga LPN  "

## 2023-06-26 ENCOUNTER — PRENATAL OFFICE VISIT (OUTPATIENT)
Dept: OBGYN | Facility: OTHER | Age: 33
End: 2023-06-26
Attending: OBSTETRICS & GYNECOLOGY
Payer: COMMERCIAL

## 2023-06-26 VITALS
HEART RATE: 100 BPM | SYSTOLIC BLOOD PRESSURE: 120 MMHG | DIASTOLIC BLOOD PRESSURE: 70 MMHG | WEIGHT: 163 LBS | BODY MASS INDEX: 27.98 KG/M2 | OXYGEN SATURATION: 98 %

## 2023-06-26 DIAGNOSIS — L30.9 DERMATITIS: Primary | ICD-10-CM

## 2023-06-26 PROCEDURE — 99207 PR POST-PARTUM 6 WK VISIT - GICH ONLY: CPT | Performed by: OBSTETRICS & GYNECOLOGY

## 2023-06-26 RX ORDER — TRIAMCINOLONE ACETONIDE 1 MG/G
OINTMENT TOPICAL 3 TIMES DAILY
Qty: 30 G | Refills: 1 | Status: SHIPPED | OUTPATIENT
Start: 2023-06-26 | End: 2024-01-05

## 2023-06-26 ASSESSMENT — EDINBURGH POSTNATAL DEPRESSION SCALE (EPDS)
I HAVE BEEN ANXIOUS OR WORRIED FOR NO GOOD REASON: NO, NOT AT ALL
I HAVE FELT SAD OR MISERABLE: NOT VERY OFTEN
I HAVE LOOKED FORWARD WITH ENJOYMENT TO THINGS: AS MUCH AS I EVER DID
I HAVE BEEN SO UNHAPPY THAT I HAVE HAD DIFFICULTY SLEEPING: NOT AT ALL
I HAVE BLAMED MYSELF UNNECESSARILY WHEN THINGS WENT WRONG: YES, SOME OF THE TIME
THE THOUGHT OF HARMING MYSELF HAS OCCURRED TO ME: NEVER
THINGS HAVE BEEN GETTING ON TOP OF ME: NO, I HAVE BEEN COPING AS WELL AS EVER
THE THOUGHT OF HARMING MYSELF HAS OCCURRED TO ME: NEVER
I HAVE BEEN ABLE TO LAUGH AND SEE THE FUNNY SIDE OF THINGS: AS MUCH AS I ALWAYS COULD
I HAVE FELT SCARED OR PANICKY FOR NO GOOD REASON: NO, NOT AT ALL
I HAVE BEEN SO UNHAPPY THAT I HAVE BEEN CRYING: ONLY OCCASIONALLY
I HAVE FELT SAD OR MISERABLE: NOT VERY OFTEN
I HAVE BLAMED MYSELF UNNECESSARILY WHEN THINGS WENT WRONG: YES, SOME OF THE TIME
I HAVE FELT SCARED OR PANICKY FOR NO GOOD REASON: NO, NOT AT ALL
I HAVE BEEN SO UNHAPPY THAT I HAVE BEEN CRYING: ONLY OCCASIONALLY
I HAVE BEEN SO UNHAPPY THAT I HAVE HAD DIFFICULTY SLEEPING: NOT AT ALL
I HAVE LOOKED FORWARD WITH ENJOYMENT TO THINGS: AS MUCH AS I EVER DID
I HAVE BEEN ANXIOUS OR WORRIED FOR NO GOOD REASON: NO, NOT AT ALL
I HAVE BEEN ABLE TO LAUGH AND SEE THE FUNNY SIDE OF THINGS: AS MUCH AS I ALWAYS COULD
TOTAL SCORE: 4
THINGS HAVE BEEN GETTING ON TOP OF ME: NO, I HAVE BEEN COPING AS WELL AS EVER

## 2023-06-26 ASSESSMENT — ANXIETY QUESTIONNAIRES
8. IF YOU CHECKED OFF ANY PROBLEMS, HOW DIFFICULT HAVE THESE MADE IT FOR YOU TO DO YOUR WORK, TAKE CARE OF THINGS AT HOME, OR GET ALONG WITH OTHER PEOPLE?: NOT DIFFICULT AT ALL
GAD7 TOTAL SCORE: 0
GAD7 TOTAL SCORE: 0
4. TROUBLE RELAXING: NOT AT ALL
7. FEELING AFRAID AS IF SOMETHING AWFUL MIGHT HAPPEN: NOT AT ALL
IF YOU CHECKED OFF ANY PROBLEMS ON THIS QUESTIONNAIRE, HOW DIFFICULT HAVE THESE PROBLEMS MADE IT FOR YOU TO DO YOUR WORK, TAKE CARE OF THINGS AT HOME, OR GET ALONG WITH OTHER PEOPLE: NOT DIFFICULT AT ALL
6. BECOMING EASILY ANNOYED OR IRRITABLE: NOT AT ALL
5. BEING SO RESTLESS THAT IT IS HARD TO SIT STILL: NOT AT ALL
7. FEELING AFRAID AS IF SOMETHING AWFUL MIGHT HAPPEN: NOT AT ALL
1. FEELING NERVOUS, ANXIOUS, OR ON EDGE: NOT AT ALL
3. WORRYING TOO MUCH ABOUT DIFFERENT THINGS: NOT AT ALL
2. NOT BEING ABLE TO STOP OR CONTROL WORRYING: NOT AT ALL

## 2023-06-26 ASSESSMENT — PAIN SCALES - GENERAL: PAINLEVEL: NO PAIN (0)

## 2023-06-26 NOTE — NURSING NOTE
"Chief Complaint   Patient presents with     Postpartum Care     6 weeks   Patient is here for post partum. Has rash on thighs/legs/arms for 1 week.    Initial /70   Pulse 100   Wt 73.9 kg (163 lb)   LMP 08/03/2022 (Exact Date)   SpO2 98%   Breastfeeding Yes   BMI 27.98 kg/m   Estimated body mass index is 27.98 kg/m  as calculated from the following:    Height as of 5/12/23: 1.626 m (5' 4\").    Weight as of this encounter: 73.9 kg (163 lb).  Medication Reconciliation: complete    Edilia Bang LPN  "

## 2023-06-26 NOTE — PROGRESS NOTES
CC: postpartum exam at 6 weeks from delivery    HPI: Raina Javier presents for postpartum exam. Baby was born by  delivery. Complications included failure to progress in second stage.  Mood: Good    Breastfeeding: going well, mastitis symptoms resolved on dynapen  Incision(s): CDI  Bleeding: No  Birthcontrol: declines, exclusively breastfeeding.    Past Medical History:   Diagnosis Date     Acid reflux      Anxiety      Depression      Depressive disorder      Fructose intolerance      IgA deficiency (H)     Diagnosed in . NO blood trasnfusions with IgA- medical alert     Kidney stone      Uncomplicated asthma      Past Surgical History:   Procedure Laterality Date      SECTION N/A 2023    Procedure:  SECTION;  Surgeon: Dawson Gomez MD;  Location: [facility] OR     SINUS SURGERY       Current Outpatient Medications   Medication     triamcinolone (KENALOG) 0.1 % external ointment     citalopram (CELEXA) 20 MG tablet     Prenatal Vit-Fe Fumarate-FA (PRENATAL MULTIVITAMIN W/IRON) 27-0.8 MG tablet     No current facility-administered medications for this visit.      Allergies   Allergen Reactions     Gentamycin [Gentamicin Sulfate]      Eye drops, swelling of the eyes and redness     Loracarbef Rash       REVIEW OF SYSTEMS:  Neg for bowel, bladder, and breast    O: /70   Pulse 100   Wt 73.9 kg (163 lb)   LMP 2022 (Exact Date)   SpO2 98%   Breastfeeding Yes   BMI 27.98 kg/m    Body mass index is 27.98 kg/m .    Exam:  Constitutional: healthy, alert, active and no distress  Skin: macular red, raised rash with plaques on the thigh on the left, some papillary changes on wrist, shoulder on the left    [place] Depression Scale  Thoughts of Harming Self: Never  Total Score: 4        No results found for any visits on 23.      I/P:  Postpartum visit.    Resume annual preventive healthcare. Continue PNV's until done with childbearing.    RTC  prn    Dawson Gomez MD FACOG  3:13 PM 6/26/2023

## 2023-07-05 ENCOUNTER — TELEPHONE (OUTPATIENT)
Dept: OBGYN | Facility: OTHER | Age: 33
End: 2023-07-05
Payer: COMMERCIAL

## 2023-07-06 NOTE — TELEPHONE ENCOUNTER
Left message to call back....................  7/6/2023   8:34 AM  Dianelys Dejesus RN on 7/6/2023 at 8:34 AM     Questions on [employer] paperwork we keep receiving by fax.  Dianelys Dejesus RN on 7/6/2023 at 8:35 AM

## 2023-07-10 NOTE — TELEPHONE ENCOUNTER
Patient returned call regarding short term disability paper work. Requesting call back tomorrow. Unsure what we were needing.  Bonnie Farr RN on 7/10/2023 at 2:50 PM

## 2023-07-11 NOTE — TELEPHONE ENCOUNTER
Writer spoke with patient who was requesting disability paperwork to be sent in again for the added  that was not in her original documentation. Writer filled out paperwork and put in Dawson Gomez MD basket for review. Request sent to HIM for release of information requested.  Bonnie Farr RN on 2023 at 10:31 AM

## 2023-07-18 ENCOUNTER — MEDICAL CORRESPONDENCE (OUTPATIENT)
Dept: HEALTH INFORMATION MANAGEMENT | Facility: OTHER | Age: 33
End: 2023-07-18
Payer: COMMERCIAL

## 2023-09-14 ENCOUNTER — MEDICAL CORRESPONDENCE (OUTPATIENT)
Dept: HEALTH INFORMATION MANAGEMENT | Facility: OTHER | Age: 33
End: 2023-09-14
Payer: COMMERCIAL

## 2023-11-14 ENCOUNTER — MEDICAL CORRESPONDENCE (OUTPATIENT)
Dept: HEALTH INFORMATION MANAGEMENT | Facility: OTHER | Age: 33
End: 2023-11-14
Payer: COMMERCIAL

## 2024-01-04 ASSESSMENT — ASTHMA QUESTIONNAIRES
QUESTION_3 LAST FOUR WEEKS HOW OFTEN DID YOUR ASTHMA SYMPTOMS (WHEEZING, COUGHING, SHORTNESS OF BREATH, CHEST TIGHTNESS OR PAIN) WAKE YOU UP AT NIGHT OR EARLIER THAN USUAL IN THE MORNING: NOT AT ALL
QUESTION_4 LAST FOUR WEEKS HOW OFTEN HAVE YOU USED YOUR RESCUE INHALER OR NEBULIZER MEDICATION (SUCH AS ALBUTEROL): NOT AT ALL
QUESTION_2 LAST FOUR WEEKS HOW OFTEN HAVE YOU HAD SHORTNESS OF BREATH: NOT AT ALL
ACT_TOTALSCORE: 25
QUESTION_1 LAST FOUR WEEKS HOW MUCH OF THE TIME DID YOUR ASTHMA KEEP YOU FROM GETTING AS MUCH DONE AT WORK, SCHOOL OR AT HOME: NONE OF THE TIME
QUESTION_5 LAST FOUR WEEKS HOW WOULD YOU RATE YOUR ASTHMA CONTROL: COMPLETELY CONTROLLED
ACT_TOTALSCORE: 25

## 2024-01-05 ENCOUNTER — OFFICE VISIT (OUTPATIENT)
Dept: FAMILY MEDICINE | Facility: OTHER | Age: 34
End: 2024-01-05
Attending: STUDENT IN AN ORGANIZED HEALTH CARE EDUCATION/TRAINING PROGRAM
Payer: COMMERCIAL

## 2024-01-05 VITALS
RESPIRATION RATE: 18 BRPM | DIASTOLIC BLOOD PRESSURE: 66 MMHG | OXYGEN SATURATION: 96 % | WEIGHT: 167.13 LBS | TEMPERATURE: 97.1 F | SYSTOLIC BLOOD PRESSURE: 112 MMHG | HEART RATE: 84 BPM | BODY MASS INDEX: 27.85 KG/M2 | HEIGHT: 65 IN

## 2024-01-05 DIAGNOSIS — R41.840 INATTENTION: ICD-10-CM

## 2024-01-05 DIAGNOSIS — Z23 NEED FOR PROPHYLACTIC VACCINATION AND INOCULATION AGAINST INFLUENZA: ICD-10-CM

## 2024-01-05 DIAGNOSIS — Z00.00 ROUTINE GENERAL MEDICAL EXAMINATION AT A HEALTH CARE FACILITY: Primary | ICD-10-CM

## 2024-01-05 DIAGNOSIS — F43.22 ADJUSTMENT DISORDER WITH ANXIOUS MOOD: ICD-10-CM

## 2024-01-05 LAB
ALBUMIN SERPL BCG-MCNC: 4.7 G/DL (ref 3.5–5.2)
ALP SERPL-CCNC: 144 U/L (ref 40–150)
ALT SERPL W P-5'-P-CCNC: 79 U/L (ref 0–50)
ANION GAP SERPL CALCULATED.3IONS-SCNC: 14 MMOL/L (ref 7–15)
AST SERPL W P-5'-P-CCNC: 30 U/L (ref 0–45)
BASOPHILS # BLD AUTO: 0 10E3/UL (ref 0–0.2)
BASOPHILS NFR BLD AUTO: 1 %
BILIRUB SERPL-MCNC: 0.3 MG/DL
BUN SERPL-MCNC: 20.8 MG/DL (ref 6–20)
CALCIUM SERPL-MCNC: 9.4 MG/DL (ref 8.6–10)
CHLORIDE SERPL-SCNC: 100 MMOL/L (ref 98–107)
CHOLEST SERPL-MCNC: 213 MG/DL
CREAT SERPL-MCNC: 0.89 MG/DL (ref 0.51–0.95)
DEPRECATED HCO3 PLAS-SCNC: 22 MMOL/L (ref 22–29)
EGFRCR SERPLBLD CKD-EPI 2021: 88 ML/MIN/1.73M2
EOSINOPHIL # BLD AUTO: 0.1 10E3/UL (ref 0–0.7)
EOSINOPHIL NFR BLD AUTO: 1 %
ERYTHROCYTE [DISTWIDTH] IN BLOOD BY AUTOMATED COUNT: 11.1 % (ref 10–15)
FASTING STATUS PATIENT QL REPORTED: NO
GLUCOSE SERPL-MCNC: 78 MG/DL (ref 70–99)
HCT VFR BLD AUTO: 39.9 % (ref 35–47)
HDLC SERPL-MCNC: 50 MG/DL
HGB BLD-MCNC: 13.7 G/DL (ref 11.7–15.7)
IMM GRANULOCYTES # BLD: 0.1 10E3/UL
IMM GRANULOCYTES NFR BLD: 1 %
LDLC SERPL CALC-MCNC: 140 MG/DL
LYMPHOCYTES # BLD AUTO: 2.5 10E3/UL (ref 0.8–5.3)
LYMPHOCYTES NFR BLD AUTO: 29 %
MCH RBC QN AUTO: 29.8 PG (ref 26.5–33)
MCHC RBC AUTO-ENTMCNC: 34.3 G/DL (ref 31.5–36.5)
MCV RBC AUTO: 87 FL (ref 78–100)
MONOCYTES # BLD AUTO: 0.6 10E3/UL (ref 0–1.3)
MONOCYTES NFR BLD AUTO: 8 %
NEUTROPHILS # BLD AUTO: 5.1 10E3/UL (ref 1.6–8.3)
NEUTROPHILS NFR BLD AUTO: 60 %
NONHDLC SERPL-MCNC: 163 MG/DL
NRBC # BLD AUTO: 0 10E3/UL
NRBC BLD AUTO-RTO: 0 /100
PLATELET # BLD AUTO: 304 10E3/UL (ref 150–450)
POTASSIUM SERPL-SCNC: 4.2 MMOL/L (ref 3.4–5.3)
PROT SERPL-MCNC: 8.1 G/DL (ref 6.4–8.3)
RBC # BLD AUTO: 4.59 10E6/UL (ref 3.8–5.2)
SODIUM SERPL-SCNC: 136 MMOL/L (ref 135–145)
T4 FREE SERPL-MCNC: 1.05 NG/DL (ref 0.9–1.7)
TRIGL SERPL-MCNC: 117 MG/DL
TSH SERPL DL<=0.005 MIU/L-ACNC: 1.53 UIU/ML (ref 0.3–4.2)
WBC # BLD AUTO: 8.4 10E3/UL (ref 4–11)

## 2024-01-05 PROCEDURE — 80053 COMPREHEN METABOLIC PANEL: CPT | Mod: ZL | Performed by: STUDENT IN AN ORGANIZED HEALTH CARE EDUCATION/TRAINING PROGRAM

## 2024-01-05 PROCEDURE — 84443 ASSAY THYROID STIM HORMONE: CPT | Mod: ZL | Performed by: STUDENT IN AN ORGANIZED HEALTH CARE EDUCATION/TRAINING PROGRAM

## 2024-01-05 PROCEDURE — 91320 SARSCV2 VAC 30MCG TRS-SUC IM: CPT | Performed by: STUDENT IN AN ORGANIZED HEALTH CARE EDUCATION/TRAINING PROGRAM

## 2024-01-05 PROCEDURE — 36415 COLL VENOUS BLD VENIPUNCTURE: CPT | Mod: ZL | Performed by: STUDENT IN AN ORGANIZED HEALTH CARE EDUCATION/TRAINING PROGRAM

## 2024-01-05 PROCEDURE — 80061 LIPID PANEL: CPT | Mod: ZL | Performed by: STUDENT IN AN ORGANIZED HEALTH CARE EDUCATION/TRAINING PROGRAM

## 2024-01-05 PROCEDURE — 90471 IMMUNIZATION ADMIN: CPT | Performed by: STUDENT IN AN ORGANIZED HEALTH CARE EDUCATION/TRAINING PROGRAM

## 2024-01-05 PROCEDURE — 90686 IIV4 VACC NO PRSV 0.5 ML IM: CPT | Performed by: STUDENT IN AN ORGANIZED HEALTH CARE EDUCATION/TRAINING PROGRAM

## 2024-01-05 PROCEDURE — G0123 SCREEN CERV/VAG THIN LAYER: HCPCS | Performed by: STUDENT IN AN ORGANIZED HEALTH CARE EDUCATION/TRAINING PROGRAM

## 2024-01-05 PROCEDURE — 86376 MICROSOMAL ANTIBODY EACH: CPT | Mod: ZL | Performed by: STUDENT IN AN ORGANIZED HEALTH CARE EDUCATION/TRAINING PROGRAM

## 2024-01-05 PROCEDURE — 85025 COMPLETE CBC W/AUTO DIFF WBC: CPT | Mod: ZL | Performed by: STUDENT IN AN ORGANIZED HEALTH CARE EDUCATION/TRAINING PROGRAM

## 2024-01-05 PROCEDURE — 87624 HPV HI-RISK TYP POOLED RSLT: CPT | Mod: ZL | Performed by: STUDENT IN AN ORGANIZED HEALTH CARE EDUCATION/TRAINING PROGRAM

## 2024-01-05 PROCEDURE — 90480 ADMN SARSCOV2 VAC 1/ONLY CMP: CPT | Performed by: STUDENT IN AN ORGANIZED HEALTH CARE EDUCATION/TRAINING PROGRAM

## 2024-01-05 PROCEDURE — 99395 PREV VISIT EST AGE 18-39: CPT | Mod: 25 | Performed by: STUDENT IN AN ORGANIZED HEALTH CARE EDUCATION/TRAINING PROGRAM

## 2024-01-05 PROCEDURE — 84439 ASSAY OF FREE THYROXINE: CPT | Mod: ZL | Performed by: STUDENT IN AN ORGANIZED HEALTH CARE EDUCATION/TRAINING PROGRAM

## 2024-01-05 RX ORDER — CITALOPRAM HYDROBROMIDE 20 MG/1
20 TABLET ORAL DAILY
Qty: 90 TABLET | Refills: 3 | Status: SHIPPED | OUTPATIENT
Start: 2024-01-05

## 2024-01-05 RX ORDER — LURASIDONE HYDROCHLORIDE 20 MG/1
TABLET, FILM COATED ORAL
COMMUNITY
Start: 2023-11-06 | End: 2024-04-23

## 2024-01-05 ASSESSMENT — PAIN SCALES - GENERAL: PAINLEVEL: NO PAIN (0)

## 2024-01-05 NOTE — NURSING NOTE
"Medication Reconciliation: Complete    Keesha Love LPN  1/5/2024 3:36 PM  Chief Complaint   Patient presents with    Physical     Annual exam    Establish Care     With Lucy Albert MD        Initial /66 (BP Location: Right arm, Patient Position: Sitting, Cuff Size: Adult Regular)   Pulse 84   Temp 97.1  F (36.2  C) (Tympanic)   Resp 18   Ht 1.651 m (5' 5\")   Wt 75.8 kg (167 lb 2 oz)   SpO2 96%   Breastfeeding Yes   BMI 27.81 kg/m   Estimated body mass index is 27.81 kg/m  as calculated from the following:    Height as of this encounter: 1.651 m (5' 5\").    Weight as of this encounter: 75.8 kg (167 lb 2 oz).  Medication Review: complete    The next two questions are to help us understand your food security.  If you are feeling you need any assistance in this area, we have resources available to support you today.          1/4/2024   SDOH- Food Insecurity   Within the past 12 months, did you worry that your food would run out before you got money to buy more? N   Within the past 12 months, did the food you bought just not last and you didn t have money to get more? N         Health Care Directive:  Patient does not have a Health Care Directive or Living Will: Discussed advance care planning with patient; however, patient declined at this time.    Keesha Love LPN      "

## 2024-01-05 NOTE — PROGRESS NOTES
SUBJECTIVE:   Raina is a 33 year old, presenting for the following:  Physical (Annual exam) and Establish Care (With Lucy Albert MD )        History of Present Illness       Reason for visit:  Annual physical/blood work/establish new primary dr    She eats 2-3 servings of fruits and vegetables daily.She consumes 1 sweetened beverage(s) daily.She exercises with enough effort to increase her heart rate 20 to 29 minutes per day.  She exercises with enough effort to increase her heart rate 5 days per week.   She is taking medications regularly.      Med refills  Establish care  Symptoms of adhd, had testing at [place] and diagnosed with bipolar. Did not start latuda. Is still bf    Today's PHQ-2 Score:       1/4/2024    10:28 PM   PHQ-2 ( 1999 [employer])   Q1: Little interest or pleasure in doing things 0   Q2: Feeling down, depressed or hopeless 1   PHQ-2 Score 1   Q1: Little interest or pleasure in doing things Not at all   Q2: Feeling down, depressed or hopeless Several days   PHQ-2 Score 1         Social History     Tobacco Use     Smoking status: Never     Smokeless tobacco: Never   Substance Use Topics     Alcohol use: Yes              No data to display              Reviewed orders with patient.  Reviewed health maintenance and updated orders accordingly - Yes  Lab work is in process    Breast Cancer Screening:    FHS-7:        No data to display                Patient under 40 years of age: Routine Mammogram Screening not recommended.   Pertinent mammograms are reviewed under the imaging tab.    History of abnormal Pap smear: Last 3 Pap and HPV Results:       2/11/2021     4:14 PM   PAP / HPV   PAP-ABSTRACT See Scanned Document           This result is from an external source.         2/11/2021     4:14 PM   PAP / HPV   PAP-ABSTRACT See Scanned Document           This result is from an external source.     Reviewed and updated as needed this visit by clinical staff   Tobacco  Allergies  Meds    "Med Hx  Surg Hx  Fam Hx          Reviewed and updated as needed this visit by Provider   Tobacco   Meds   Med Hx  Surg Hx  Fam Hx             Review of Systems  CONSTITUTIONAL: NEGATIVE for fever, chills, change in weight  INTEGUMENTARU/SKIN: NEGATIVE for worrisome rashes, moles or lesions  EYES: NEGATIVE for vision changes or irritation  ENT: NEGATIVE for ear, mouth and throat problems  RESP: NEGATIVE for significant cough or SOB  BREAST: NEGATIVE for masses, tenderness or discharge  CV: NEGATIVE for chest pain, palpitations or peripheral edema  GI: NEGATIVE for nausea, abdominal pain, heartburn, or change in bowel habits  : NEGATIVE for unusual urinary or vaginal symptoms. Periods are regular.  MUSCULOSKELETAL: NEGATIVE for significant arthralgias or myalgia  NEURO: NEGATIVE for weakness, dizziness or paresthesias  PSYCHIATRIC: NEGATIVE for changes in mood or affect     OBJECTIVE:   /66 (BP Location: Right arm, Patient Position: Sitting, Cuff Size: Adult Regular)   Pulse 84   Temp 97.1  F (36.2  C) (Tympanic)   Resp 18   Ht 1.651 m (5' 5\")   Wt 75.8 kg (167 lb 2 oz)   SpO2 96%   Breastfeeding Yes   BMI 27.81 kg/m    Physical Exam  GENERAL: healthy, alert and no distress  EYES: Eyes grossly normal to inspection, PERRL and conjunctivae and sclerae normal  HENT: ear canals and TM's normal, nose and mouth without ulcers or lesions  NECK: no adenopathy, no asymmetry, masses, or scars and thyroid normal to palpation  RESP: lungs clear to auscultation - no rales, rhonchi or wheezes  CV: regular rate and rhythm, normal S1 S2, no S3 or S4, no murmur, click or rub, no peripheral edema and peripheral pulses strong   (female): normal female external genitalia, normal urethral meatus, vaginal mucosa, normal cervix/adnexa/uterus without masses or discharge  PSYCH: mentation appears normal, affect normal/bright    Diagnostic Test Results:  Labs reviewed in Epic    ASSESSMENT/PLAN:   Raina was seen " "today for physical and establish care.    Diagnoses and all orders for this visit:    Routine general medical examination at a health care facility  Strong family history of hashimoto's so will screen thyroid  -     PAP screen with HPV - recommended age 30 - 65 years  -     CBC and Differential; Future  -     Comprehensive Metabolic Panel; Future  -     TSH Reflex GH; Future  -     Thyroid peroxidase antibody; Future  -     T4, Free; Future  -     Lipid Panel; Future    Adjustment disorder with anxious mood  Stable mood, refilled  -     citalopram (CELEXA) 20 MG tablet; Take 1 tablet (20 mg) by mouth daily    Inattention  Recommend [facility] counseling for repeat testing.   -     Adult Mental Health  Referral; Future    Other orders  -     INFLUENZA VACCINE 18-64Y (FLUBLOK)  -     COVID-19 12+ (2023-24) (PFIZER)              COUNSELING:  Reviewed preventive health counseling, as reflected in patient instructions       Family planning       Colorectal Cancer Screening       Breast Cancer screening 0      BMI:   Estimated body mass index is 27.81 kg/m  as calculated from the following:    Height as of this encounter: 1.651 m (5' 5\").    Weight as of this encounter: 75.8 kg (167 lb 2 oz).         She reports that she has never smoked. She has never used smokeless tobacco.          Lucy Salazar MD  [facility] AND [facility]  "

## 2024-01-05 NOTE — PATIENT INSTRUCTIONS
[facility]: [phone], multiple providers for therapy, diagnostic assessments, medication management, [facility]/shelter           Preventive Health Recommendations  Female Ages 26 - 39  Yearly exam:   See your health care provider every year in order to  Review health changes.   Discuss preventive care.    Review your medicines if you your doctor has prescribed any.    Until age 30: Get a Pap test every three years (more often if you have had an abnormal result).    After age 30: Talk to your doctor about whether you should have a Pap test every 3 years or have a Pap test with HPV screening every 5 years.   You do not need a Pap test if your uterus was removed (hysterectomy) and you have not had cancer.  You should be tested each year for STDs (sexually transmitted diseases), if you're at risk.   Talk to your provider about how often to have your cholesterol checked.  If you are at risk for diabetes, you should have a diabetes test (fasting glucose).  Shots: Get a flu shot each year. Get a tetanus shot every 10 years.   Nutrition:   Eat at least 5 servings of fruits and vegetables each day.  Eat whole-grain bread, whole-wheat pasta and brown rice instead of white grains and rice.  Get adequate Calcium and Vitamin D.     Lifestyle  Exercise at least 150 minutes a week (30 minutes a day, 5 days of the week). This will help you control your weight and prevent disease.  Limit alcohol to one drink per day.  No smoking.   Wear sunscreen to prevent skin cancer.  See your dentist every six months for an exam and cleaning.

## 2024-01-08 LAB — THYROPEROXIDASE AB SERPL-ACNC: <10 IU/ML

## 2024-01-09 LAB
BKR LAB AP GYN ADEQUACY: NORMAL
BKR LAB AP GYN INTERPRETATION: NORMAL
BKR LAB AP HPV REFLEX: NORMAL
BKR LAB AP PREVIOUS ABNORMAL: NORMAL
PATH REPORT.COMMENTS IMP SPEC: NORMAL
PATH REPORT.COMMENTS IMP SPEC: NORMAL
PATH REPORT.RELEVANT HX SPEC: NORMAL

## 2024-01-12 LAB
HUMAN PAPILLOMA VIRUS 16 DNA: NEGATIVE
HUMAN PAPILLOMA VIRUS 18 DNA: NEGATIVE
HUMAN PAPILLOMA VIRUS FINAL DIAGNOSIS: ABNORMAL
HUMAN PAPILLOMA VIRUS OTHER HR: POSITIVE

## 2024-04-02 ENCOUNTER — OFFICE VISIT (OUTPATIENT)
Dept: INTERNAL MEDICINE | Facility: OTHER | Age: 34
End: 2024-04-02
Attending: NURSE PRACTITIONER
Payer: COMMERCIAL

## 2024-04-02 VITALS
WEIGHT: 168 LBS | DIASTOLIC BLOOD PRESSURE: 70 MMHG | RESPIRATION RATE: 16 BRPM | HEART RATE: 76 BPM | OXYGEN SATURATION: 97 % | BODY MASS INDEX: 27.96 KG/M2 | SYSTOLIC BLOOD PRESSURE: 136 MMHG | TEMPERATURE: 97.9 F

## 2024-04-02 DIAGNOSIS — D80.2 SELECTIVE IGA IMMUNODEFICIENCY (H): ICD-10-CM

## 2024-04-02 DIAGNOSIS — J01.01 ACUTE RECURRENT MAXILLARY SINUSITIS: Primary | ICD-10-CM

## 2024-04-02 PROCEDURE — 99213 OFFICE O/P EST LOW 20 MIN: CPT | Performed by: NURSE PRACTITIONER

## 2024-04-02 RX ORDER — AMOXICILLIN 875 MG
875 TABLET ORAL 2 TIMES DAILY
Qty: 20 TABLET | Refills: 0 | Status: SHIPPED | OUTPATIENT
Start: 2024-04-02 | End: 2024-04-12

## 2024-04-02 RX ORDER — TRIAMCINOLONE ACETONIDE 1 MG/G
CREAM TOPICAL
COMMUNITY
Start: 2023-07-27 | End: 2024-04-23

## 2024-04-02 ASSESSMENT — PAIN SCALES - GENERAL: PAINLEVEL: MILD PAIN (2)

## 2024-04-02 NOTE — PROGRESS NOTES
ICD-10-CM    1. Acute recurrent maxillary sinusitis  J01.01 amoxicillin (AMOXIL) 875 MG tablet      2. Patient is a currently breast-feeding mother  Z39.1       3. 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  In the past she has failed shorter courses of antibiotics.  She will follow-up if symptoms not improving as expected or if develops worsening.    Jabari Paris is a 34 year old, presenting for the following health issues:  Sinus Problem        4/2/2024     8:12 AM   Additional Questions   Roomed by Peyton GOSS     She is here today for sinus pain and pressure.  Her symptoms started a month ago.  It initially began with a cold but continues to linger.  She is getting more pressure in the maxillary sinuses and discomfort in her teeth.  Her right ear feels plugged.  She has history of deviated septum repair and history of recurrent sinusitis.  Past medical history also includes IgA immunodeficiency and currently breast-feeding infant.  She reports she has tolerated amoxicillin in the past.    Sinus Problem                        Objective    /70 (BP Location: Right arm, Patient Position: Sitting, Cuff Size: Adult Regular)   Pulse 76   Temp 97.9  F (36.6  C) (Tympanic)   Resp 16   Wt 76.2 kg (168 lb)   LMP 03/20/2024   SpO2 97%   Breastfeeding Yes   BMI 27.96 kg/m    Body mass index is 27.96 kg/m .  Physical Exam   Pleasant female no acute distress.  Affect normal.  Alert and oriented x 4.  TMs without erythema.  Fluid noted at right TM.  Oral mucosa pink and moist.  Throat without erythema.  Bilateral nares swollen and inflamed with tenderness over maxillary sinuses.  Neck supple without adenopathy.  Lung fields clear to auscultation.  Cardiovascular regular.            Signed Electronically by: Mel Ruiz NP

## 2024-04-23 ENCOUNTER — OFFICE VISIT (OUTPATIENT)
Dept: INTERNAL MEDICINE | Facility: OTHER | Age: 34
End: 2024-04-23
Attending: INTERNAL MEDICINE
Payer: COMMERCIAL

## 2024-04-23 VITALS
DIASTOLIC BLOOD PRESSURE: 78 MMHG | SYSTOLIC BLOOD PRESSURE: 116 MMHG | WEIGHT: 163.8 LBS | BODY MASS INDEX: 27.29 KG/M2 | TEMPERATURE: 97.7 F | RESPIRATION RATE: 18 BRPM | HEART RATE: 81 BPM | OXYGEN SATURATION: 98 % | HEIGHT: 65 IN

## 2024-04-23 DIAGNOSIS — R05.8 COUGH PRODUCTIVE OF PURULENT SPUTUM: ICD-10-CM

## 2024-04-23 DIAGNOSIS — J01.11 ACUTE RECURRENT FRONTAL SINUSITIS: Primary | ICD-10-CM

## 2024-04-23 DIAGNOSIS — D80.2 SELECTIVE IGA IMMUNODEFICIENCY (H): ICD-10-CM

## 2024-04-23 PROCEDURE — 99213 OFFICE O/P EST LOW 20 MIN: CPT | Performed by: INTERNAL MEDICINE

## 2024-04-23 RX ORDER — AZITHROMYCIN 250 MG/1
TABLET, FILM COATED ORAL
Qty: 12 TABLET | Refills: 0 | Status: SHIPPED | OUTPATIENT
Start: 2024-04-23 | End: 2024-04-28

## 2024-04-23 ASSESSMENT — ENCOUNTER SYMPTOMS
CHILLS: 0
FEVER: 0
COUGH: 1
SHORTNESS OF BREATH: 0

## 2024-04-23 ASSESSMENT — PAIN SCALES - GENERAL: PAINLEVEL: NO PAIN (0)

## 2024-04-23 NOTE — PROGRESS NOTES
"Assessment & Plan     ICD-10-CM    1. Acute recurrent frontal sinusitis  J01.11 azithromycin (ZITHROMAX) 250 MG tablet      2. Cough productive of purulent sputum  R05.8 azithromycin (ZITHROMAX) 250 MG tablet      3. Selective IgA Deficiency  D80.2 azithromycin (ZITHROMAX) 250 MG tablet         Patient presents for evaluation of sinus concerns.  Patient is IgA deficient.  Has had issues with chronic recurrent sinus infections for a number of years.  Recently completed a course of amoxicillin at the end of March.  She is currently breast-feeding.  Her child is described 1-year-old.    Reports symptoms restarted after completion of amoxicillin with stuffy nose and cough on 4/14.    Has typically had 5-day course of Zithromax followed by 5 days with no antibiotics, followed by second course of 5 days of Zithromax.  Prescription sent to pharmacy.  Close follow-up as needed.           BMI  Estimated body mass index is 27.26 kg/m  as calculated from the following:    Height as of this encounter: 1.651 m (5' 5\").    Weight as of this encounter: 74.3 kg (163 lb 12.8 oz).         Return for follow-up as needed for new or worsening symptoms, Appointments: [phone].      Pacheco Ortiz MD  [facility]    Review of Systems   Constitutional:  Negative for chills and fever.   HENT:  Positive for congestion.    Respiratory:  Positive for cough. Negative for shortness of breath.    Allergic/Immunologic: Positive for immunocompromised state.       Jabari Paris is a 34 year old, presenting for the following health issues:  Sinus Problem and Cough        4/23/2024     3:10 PM   Additional Questions   Roomed by Holley mark     History of Present Illness       Reason for visit:  Bad cough and stuffy nose-getting worse- potential sinus infection  Symptom onset:  1-2 weeks ago  Symptoms include:  Cough, stuffy nose  Symptom intensity:  Severe  Symptom progression:  Worsening  Had these symptoms before:  " "Yes  Has tried/received treatment for these symptoms:  Yes  Previous treatment was successful:  Yes  Prior treatment description:  Amoxicillan 10 days  What makes it worse:  Exercising  What makes it better:  No    She eats 2-3 servings of fruits and vegetables daily.She consumes 1 sweetened beverage(s) daily.She exercises with enough effort to increase her heart rate 20 to 29 minutes per day.  She exercises with enough effort to increase her heart rate 4 days per week.   She is taking medications regularly.                     Objective    /78   Pulse 81   Temp 97.7  F (36.5  C) (Temporal)   Resp 18   Ht 1.651 m (5' 5\")   Wt 74.3 kg (163 lb 12.8 oz)   LMP 03/20/2024 (Approximate)   SpO2 98%   Breastfeeding Yes   BMI 27.26 kg/m    Body mass index is 27.26 kg/m .  Physical Exam  Constitutional:       Appearance: Normal appearance. She is not ill-appearing.   HENT:      Head:      Comments: Mild Maxillary tenderness to percussion.   Less tenderness of ethmoid and frontal sinuses.      Right Ear: Tympanic membrane, ear canal and external ear normal. There is no impacted cerumen.      Left Ear: Tympanic membrane, ear canal and external ear normal. There is no impacted cerumen.      Nose: Congestion and rhinorrhea present.      Mouth/Throat:      Pharynx: No oropharyngeal exudate or posterior oropharyngeal erythema.   Eyes:      General: No scleral icterus.     Conjunctiva/sclera: Conjunctivae normal.   Cardiovascular:      Rate and Rhythm: Normal rate and regular rhythm.      Pulses: Normal pulses.   Pulmonary:      Effort: Pulmonary effort is normal.      Breath sounds: Normal breath sounds. No wheezing.   Skin:     General: Skin is warm and dry.      Findings: No rash.   Neurological:      Mental Status: She is alert. Mental status is at baseline.   Psychiatric:         Mood and Affect: Mood normal.         Behavior: Behavior normal.                    Signed Electronically by: Pacheco Ortiz MD    "

## 2024-04-23 NOTE — PATIENT INSTRUCTIONS
Acute recurrent frontal sinusitis  Cough productive of purulent sputum  Selective IgA Deficiency    START:   - azithromycin (ZITHROMAX) 250 MG tablet; Take 2 tablets (500 mg) by mouth daily for 1 day, THEN 1 tablet (250 mg) daily for 4 days. -- Skip 5 days, then restart for 5 more days      Return as needed for follow-up for new / worsening symptoms.    Clinic : [phone]  Appointment line: [phone]

## 2024-04-23 NOTE — NURSING NOTE
Patient is here for possible sinus infection, cough. Started last Sunday.     Patient's last menstrual period was 03/20/2024 (approximate).  Medication Reconciliation: complete    Holley Rivera LPN 4/23/2024 3:13 PM       Advance care directive on file? no  Advance care directive provided to patient? no       Holley Rivera LPN

## 2024-06-06 ENCOUNTER — E-VISIT (OUTPATIENT)
Dept: URGENT CARE | Facility: CLINIC | Age: 34
End: 2024-06-06
Payer: COMMERCIAL

## 2024-06-06 DIAGNOSIS — J06.9 UPPER RESPIRATORY TRACT INFECTION, UNSPECIFIED TYPE: Primary | ICD-10-CM

## 2024-06-06 PROCEDURE — 99207 PR NON-BILLABLE SERV PER CHARTING: CPT | Performed by: NURSE PRACTITIONER

## 2024-06-07 ENCOUNTER — OFFICE VISIT (OUTPATIENT)
Dept: FAMILY MEDICINE | Facility: OTHER | Age: 34
End: 2024-06-07
Attending: FAMILY MEDICINE
Payer: COMMERCIAL

## 2024-06-07 VITALS
HEART RATE: 96 BPM | RESPIRATION RATE: 16 BRPM | OXYGEN SATURATION: 98 % | HEIGHT: 65 IN | WEIGHT: 167.6 LBS | SYSTOLIC BLOOD PRESSURE: 120 MMHG | TEMPERATURE: 97.6 F | BODY MASS INDEX: 27.92 KG/M2 | DIASTOLIC BLOOD PRESSURE: 70 MMHG

## 2024-06-07 DIAGNOSIS — Z33.1 PREGNANT STATE, INCIDENTAL: ICD-10-CM

## 2024-06-07 DIAGNOSIS — D80.2 SELECTIVE IGA IMMUNODEFICIENCY (H): ICD-10-CM

## 2024-06-07 DIAGNOSIS — B96.89 ACUTE BACTERIAL SINUSITIS: Primary | ICD-10-CM

## 2024-06-07 DIAGNOSIS — J01.90 ACUTE BACTERIAL SINUSITIS: Primary | ICD-10-CM

## 2024-06-07 PROCEDURE — 99213 OFFICE O/P EST LOW 20 MIN: CPT | Performed by: FAMILY MEDICINE

## 2024-06-07 RX ORDER — TRIAMCINOLONE ACETONIDE 1 MG/G
CREAM TOPICAL
COMMUNITY
Start: 2023-07-27

## 2024-06-07 ASSESSMENT — PAIN SCALES - GENERAL: PAINLEVEL: EXTREME PAIN (8)

## 2024-06-07 NOTE — PATIENT INSTRUCTIONS
Dear Raina Javier,    We are sorry you are not feeling well. Based on the responses you provided, it is recommended that you be seen in-person in urgent care so we can better evaluate your symptoms. Please click here to find the nearest urgent care location to you.   You will not be charged for this Visit. Thank you for trusting us with your care.    Dolly Min, CNP

## 2024-06-07 NOTE — PATIENT INSTRUCTIONS

## 2024-06-07 NOTE — PROGRESS NOTES
"    Assessment & Plan       ICD-10-CM    1. Acute bacterial sinusitis  J01.90 amoxicillin-clavulanate (AUGMENTIN) 875-125 MG tablet    B96.89       2. Breast feeding status of mother  Z39.1       3. Pregnant state, incidental  Z33.1       4. 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  Prescription for Augmentin bid for 7 days.  Continue with neti pot and other symptomatic care items  Patient has prenatal vitamins that she has continued to take.      PDMP Review       None                          BMI  Estimated body mass index is 27.89 kg/m  as calculated from the following:    Height as of this encounter: 1.651 m (5' 5\").    Weight as of this encounter: 76 kg (167 lb 9.6 oz).         Return if symptoms worsen or fail to improve.    ANABELLA MAURER MD  [facility] H[facility]PITAL    Subjective   Raina Javier is a 34 year old female  presenting for the following health issues: sinus symptoms                           HPI Raina Javier is a 34 year old female presents for sinus infection.  History of recurrent sinusitis.    CT of sinuses done 2009.   Had sinus surgery 2020.   When she was a kid, would get about 10 infections a year.      Most recently in April -  and seemed to clear.  Chills last night.  Symptoms of congestion  - been using her neti pot; left worse than right.      Complicating factor is IgA deficiency    Just found out she is pregnant this past week.    Is breast feeding         Current Outpatient Medications   Medication Sig Dispense Refill    amoxicillin-clavulanate (AUGMENTIN) 875-125 MG tablet Take 1 tablet by mouth 2 times daily for 7 days 14 tablet 0    triamcinolone (KENALOG) 0.1 % external cream Apply  topically two times a day. Apply to affected area: Twice daily for 1 week consistently      citalopram (CELEXA) 20 MG tablet Take 1 tablet (20 mg) by mouth daily 90 " "tablet 3    Prenatal Vit-Fe Fumarate-FA (PRENATAL MULTIVITAMIN W/IRON) 27-0.8 MG tablet Take 1 tablet by mouth daily       No current facility-administered medications for this visit.     Past Medical History:   Diagnosis Date    Acid reflux     Anxiety     Depression     Depressive disorder     Fructose intolerance     IgA deficiency (H)     Diagnosed in . NO blood trasnfusions with IgA- medical alert    Kidney stone     Uncomplicated asthma                Review of Systems           9/19/2022     3:04 PM 10/6/2022    11:11 AM 11/7/2022     3:06 PM   PHQ   PHQ-9 Total Score 10 16 15   Q9: Thoughts of better off dead/self-harm past 2 weeks Not at all Not at all Not at all         6/26/2023     3:04 PM   JAMAL-7 SCORE   Total Score 0 (minimal anxiety)   Total Score 0             Objective  /70 (BP Location: Right arm, Patient Position: Sitting, Cuff Size: Adult Regular)   Pulse 96   Temp 97.6  F (36.4  C) (Temporal)   Resp 16   Ht 1.651 m (5' 5\")   Wt 76 kg (167 lb 9.6 oz)   LMP 04/30/2024 (Approximate)   SpO2 98%   Breastfeeding Yes   BMI 27.89 kg/m     Physical Exam   GENERAL: alert and no distress  EYES: Eyes grossly normal to inspection, PERRL and conjunctivae and sclerae normal  HENT: normal cephalic/atraumatic, both ears: clear effusion, nasal mucosa edematous , rhinorrhea white, and oropharynx clear; sinuses tender               Answers submitted by the patient for this visit:  General Questionnaire (Submitted on 6/7/2024)  Chief Complaint: Chronic problems general questions HPI Form  How many servings of fruits and vegetables do you eat daily?: 2-3  On average, how many sweetened beverages do you drink each day (Examples: soda, juice, sweet tea, etc.  Do NOT count diet or artificially sweetened beverages)?: 1  How many minutes a day do you exercise enough to make your heart beat faster?: 20 to 29  How many days a week do you exercise enough to make your heart beat faster?: 5  How many " days per week do you miss taking your medication?: 0  General Concern (Submitted on 6/7/2024)  Chief Complaint: Chronic problems general questions HPI Form  What is the reason for your visit today?: Sinus infection  When did your symptoms begin?: 3-7 days ago  What are your symptoms?: Pressure in teetch, face, ears, and stuff nose  How would you describe these symptoms?: Severe  Are your symptoms:: Worsening  Have you had these symptoms before?: Yes  Have you tried or received treatment for these symptoms before?: Yes  Did that treatment work? : Yes  Please describe the treatment you had:: Amoxicilkian and zithromax

## 2024-07-26 ENCOUNTER — OFFICE VISIT (OUTPATIENT)
Dept: FAMILY MEDICINE | Facility: OTHER | Age: 34
End: 2024-07-26
Attending: FAMILY MEDICINE
Payer: COMMERCIAL

## 2024-07-26 VITALS
DIASTOLIC BLOOD PRESSURE: 64 MMHG | WEIGHT: 164.6 LBS | RESPIRATION RATE: 16 BRPM | OXYGEN SATURATION: 98 % | SYSTOLIC BLOOD PRESSURE: 108 MMHG | TEMPERATURE: 98.6 F | HEART RATE: 88 BPM | BODY MASS INDEX: 27.39 KG/M2

## 2024-07-26 DIAGNOSIS — K14.6 TONGUE SORE: Primary | ICD-10-CM

## 2024-07-26 DIAGNOSIS — B37.0 CANDIDIASIS OF MOUTH: ICD-10-CM

## 2024-07-26 PROCEDURE — 99213 OFFICE O/P EST LOW 20 MIN: CPT | Performed by: FAMILY MEDICINE

## 2024-07-26 RX ORDER — NYSTATIN 100000/ML
500000 SUSPENSION, ORAL (FINAL DOSE FORM) ORAL 4 TIMES DAILY
Qty: 120 ML | Refills: 0 | Status: SHIPPED | OUTPATIENT
Start: 2024-07-26

## 2024-07-26 RX ORDER — NYSTATIN 100000/ML
500000 SUSPENSION, ORAL (FINAL DOSE FORM) ORAL 4 TIMES DAILY
Qty: 120 ML | Refills: 0 | Status: SHIPPED | OUTPATIENT
Start: 2024-07-26 | End: 2024-07-26

## 2024-07-26 ASSESSMENT — ASTHMA QUESTIONNAIRES
QUESTION_3 LAST FOUR WEEKS HOW OFTEN DID YOUR ASTHMA SYMPTOMS (WHEEZING, COUGHING, SHORTNESS OF BREATH, CHEST TIGHTNESS OR PAIN) WAKE YOU UP AT NIGHT OR EARLIER THAN USUAL IN THE MORNING: NOT AT ALL
ACT_TOTALSCORE: 25
QUESTION_5 LAST FOUR WEEKS HOW WOULD YOU RATE YOUR ASTHMA CONTROL: COMPLETELY CONTROLLED
ACT_TOTALSCORE: 25
QUESTION_1 LAST FOUR WEEKS HOW MUCH OF THE TIME DID YOUR ASTHMA KEEP YOU FROM GETTING AS MUCH DONE AT WORK, SCHOOL OR AT HOME: NONE OF THE TIME
QUESTION_4 LAST FOUR WEEKS HOW OFTEN HAVE YOU USED YOUR RESCUE INHALER OR NEBULIZER MEDICATION (SUCH AS ALBUTEROL): NOT AT ALL
QUESTION_2 LAST FOUR WEEKS HOW OFTEN HAVE YOU HAD SHORTNESS OF BREATH: NOT AT ALL

## 2024-07-26 ASSESSMENT — PAIN SCALES - GENERAL: PAINLEVEL: SEVERE PAIN (7)

## 2024-07-26 NOTE — PROGRESS NOTES
"  Assessment & Plan       ICD-10-CM    1. Tongue sore  K14.6 magic mouthwash suspension (diphenhydrAMINE, lidocaine, aluminum-magnesium & simethicone)     DISCONTINUED: magic mouthwash suspension (diphenhydrAMINE, lidocaine, aluminum-magnesium & simethicone)      2. Candidiasis of mouth  B37.0 nystatin (MYCOSTATIN) 237743 UNIT/ML suspension     DISCONTINUED: nystatin (MYCOSTATIN) 161778 UNIT/ML suspension        Discussed possible yeast contributing, would recommend treating.  Discussed symptomatic management of tongue sores.  Differential includes viral etiology, other infectious process, autoimmune process.  For now, would recommend monitoring with follow-up if additional concerns or new symptoms develop.  Anticipate improvement.        BMI  Estimated body mass index is 27.39 kg/m  as calculated from the following:    Height as of 6/7/24: 1.651 m (5' 5\").    Weight as of this encounter: 74.7 kg (164 lb 9.6 oz).         No follow-ups on file.    Jabari Paris is a 34 year old, presenting for the following health issues:  Mouth Problem (Has canker sore in mouth, tongue is white and having trouble eating. symptoms started about Tuesday. )      7/26/2024     2:44 PM   Additional Questions   Roomed by Elo   Accompanied by self     History of Present Illness       Reason for visit:  Cankor sores and white tongue-hurts to eat drink swallow and talk  Symptom onset:  3-7 days ago  Symptoms include:  White tongue, cankor sorees  Symptom intensity:  Severe  Symptom progression:  Worsening  Had these symptoms before:  No  What makes it worse:  Eating, drinking, talking  What makes it better:  Over counter stuff a little bit    She eats 2-3 servings of fruits and vegetables daily.She consumes 1 sweetened beverage(s) daily.She exercises with enough effort to increase her heart rate 30 to 60 minutes per day.  She exercises with enough effort to increase her heart rate 4 days per week.   She is taking medications " regularly.       Patient has had about 3 days of oral pain.  She has a sore on the right side of her tongue and a smaller sore on the left side of her tongue.  No other oral lesions that she is aware of.  She has a white coating on the surface of her tongue.  Her mouth is generally sore, making it difficult to eat at times.    No fevers.  No upper respiratory symptoms.  She did have a sinus infection in June and was treated with an antibiotic.  She states that she had vaginal yeast symptoms a few weeks ago and treated these with an over-the-counter yeast treatment.    She has a known IgA deficiency.    She had a negative test for celiac disease in 2015, looks like this was through a biopsy done at the time of an EGD.    No known sick exposures.  She does have small kids.  Her sisters kids recently had hand-foot-and-mouth disease, but she does not think she had contact with them.          Objective    /64 (BP Location: Right arm, Patient Position: Sitting, Cuff Size: Adult Regular)   Pulse 88   Temp 98.6  F (37  C) (Tympanic)   Resp 16   Wt 74.7 kg (164 lb 9.6 oz)   LMP 04/30/2024 (Approximate)   SpO2 98%   BMI 27.39 kg/m    Body mass index is 27.39 kg/m .  Physical Exam  Constitutional:       Appearance: She is well-developed.   HENT:      Right Ear: Tympanic membrane and external ear normal.      Left Ear: Tympanic membrane and external ear normal.      Nose: No congestion or rhinorrhea.      Mouth/Throat:      Comments: White coating on tongue.  1 cm shallow ulcer on right side, mid tongue.  Smaller 2 mm ulcer on left side of the tongue.  No other oral lesions noted.  No enlarged tonsils or exudate.  No submandibular lymphadenopathy.  No cervical anterior cervical or posterior cervical lymphadenopathy.  No supraclavicular lymphadenopathy.  Eyes:      General: No scleral icterus.     Conjunctiva/sclera: Conjunctivae normal.   Cardiovascular:      Rate and Rhythm: Normal rate.   Pulmonary:      Effort:  Pulmonary effort is normal. No respiratory distress.   Musculoskeletal:      Cervical back: No rigidity or tenderness.   Lymphadenopathy:      Cervical: No cervical adenopathy.   Skin:     Findings: No rash.   Neurological:      Mental Status: She is alert.             Signed Electronically by: Lindsey Watts MD

## 2024-07-26 NOTE — NURSING NOTE
"Chief Complaint   Patient presents with    Mouth Problem     Has canker sore in mouth, tongue is white and having trouble eating. symptoms started about Tuesday.        Initial /64 (BP Location: Right arm, Patient Position: Sitting, Cuff Size: Adult Regular)   Pulse 88   Temp 98.6  F (37  C) (Tympanic)   Resp 16   Wt 74.7 kg (164 lb 9.6 oz)   LMP 04/30/2024 (Approximate)   SpO2 98%   BMI 27.39 kg/m   Estimated body mass index is 27.39 kg/m  as calculated from the following:    Height as of 6/7/24: 1.651 m (5' 5\").    Weight as of this encounter: 74.7 kg (164 lb 9.6 oz).  Medication Reconciliation: complete    Elo Feng LPN    Advance Care Directive reviewed    "

## 2024-07-29 ENCOUNTER — OFFICE VISIT (OUTPATIENT)
Dept: FAMILY MEDICINE | Facility: OTHER | Age: 34
End: 2024-07-29
Attending: FAMILY MEDICINE
Payer: COMMERCIAL

## 2024-07-29 VITALS
DIASTOLIC BLOOD PRESSURE: 62 MMHG | BODY MASS INDEX: 27.16 KG/M2 | OXYGEN SATURATION: 99 % | WEIGHT: 163.2 LBS | HEART RATE: 68 BPM | TEMPERATURE: 99.1 F | SYSTOLIC BLOOD PRESSURE: 118 MMHG

## 2024-07-29 DIAGNOSIS — K14.8 TONGUE LESION: Primary | ICD-10-CM

## 2024-07-29 LAB
ALBUMIN SERPL BCG-MCNC: 4.3 G/DL (ref 3.5–5.2)
ALP SERPL-CCNC: 49 U/L (ref 40–150)
ALT SERPL W P-5'-P-CCNC: 18 U/L (ref 0–50)
ANION GAP SERPL CALCULATED.3IONS-SCNC: 12 MMOL/L (ref 7–15)
AST SERPL W P-5'-P-CCNC: 22 U/L (ref 0–45)
BILIRUB SERPL-MCNC: 0.4 MG/DL
BUN SERPL-MCNC: 10.5 MG/DL (ref 6–20)
CALCIUM SERPL-MCNC: 9.5 MG/DL (ref 8.8–10.4)
CHLORIDE SERPL-SCNC: 102 MMOL/L (ref 98–107)
CREAT SERPL-MCNC: 0.67 MG/DL (ref 0.51–0.95)
EGFRCR SERPLBLD CKD-EPI 2021: >90 ML/MIN/1.73M2
ERYTHROCYTE [DISTWIDTH] IN BLOOD BY AUTOMATED COUNT: 11.5 % (ref 10–15)
GLUCOSE SERPL-MCNC: 79 MG/DL (ref 70–99)
HCO3 SERPL-SCNC: 23 MMOL/L (ref 22–29)
HCT VFR BLD AUTO: 38.5 % (ref 35–47)
HGB BLD-MCNC: 13.3 G/DL (ref 11.7–15.7)
Lab: NORMAL
MCH RBC QN AUTO: 29.8 PG (ref 26.5–33)
MCHC RBC AUTO-ENTMCNC: 34.5 G/DL (ref 31.5–36.5)
MCV RBC AUTO: 86 FL (ref 78–100)
PERFORMING LABORATORY: NORMAL
PLATELET # BLD AUTO: 237 10E3/UL (ref 150–450)
POTASSIUM SERPL-SCNC: 4.1 MMOL/L (ref 3.4–5.3)
PROT SERPL-MCNC: 7.5 G/DL (ref 6.4–8.3)
RBC # BLD AUTO: 4.46 10E6/UL (ref 3.8–5.2)
SODIUM SERPL-SCNC: 137 MMOL/L (ref 135–145)
SPECIMEN STATUS: NORMAL
TEST NAME: NORMAL
WBC # BLD AUTO: 7.7 10E3/UL (ref 4–11)

## 2024-07-29 PROCEDURE — 36415 COLL VENOUS BLD VENIPUNCTURE: CPT | Mod: ZL | Performed by: FAMILY MEDICINE

## 2024-07-29 PROCEDURE — 85041 AUTOMATED RBC COUNT: CPT | Mod: ZL | Performed by: FAMILY MEDICINE

## 2024-07-29 PROCEDURE — 99214 OFFICE O/P EST MOD 30 MIN: CPT | Performed by: FAMILY MEDICINE

## 2024-07-29 PROCEDURE — 86694 HERPES SIMPLEX NES ANTBDY: CPT | Mod: ZL | Performed by: FAMILY MEDICINE

## 2024-07-29 PROCEDURE — 80053 COMPREHEN METABOLIC PANEL: CPT | Mod: ZL | Performed by: FAMILY MEDICINE

## 2024-07-29 RX ORDER — LIDOCAINE HYDROCHLORIDE 20 MG/ML
15 SOLUTION OROPHARYNGEAL
Qty: 100 ML | Refills: 1 | Status: SHIPPED | OUTPATIENT
Start: 2024-07-29

## 2024-07-29 ASSESSMENT — PAIN SCALES - GENERAL: PAINLEVEL: EXTREME PAIN (9)

## 2024-07-29 NOTE — PROGRESS NOTES
Assessment & Plan       ICD-10-CM    1. Tongue lesion  K14.8 Herpes Simplex Virus 1 and 2 IgG     CBC W PLT No Diff     Comprehensive Metabolic Panel     lidocaine, viscous, (XYLOCAINE) 2 % solution     CBC W PLT No Diff     Comprehensive Metabolic Panel     [facility]; [id number]; Herpes Simplex Virus Type 1 and/or 2 Antibodies, IgG and IgM with Reflex to Type 1 and 2 Glycoprotein G-Specific Ab, IgG; HERPR PAN (Laboratory Miscellaneous Order)     [facility]; [id number]; Herpes Simplex Virus Type 1 and/or 2 Antibodies, IgG and IgM with Reflex to Type 1 and 2 Glycoprotein G-Specific Ab, IgG; HERPR PAN (Laboratory Miscellaneous Order)     [id number]; Herpes Simplex Virus Type 1 and/or 2 Antibodies, IgG and IgM with Reflex to Type 1 and 2 Glycoprotein G-Specific Ab, IgG; HERPR PAN: [facility] Miscellaneous Test     CANCELED: ZZHCL HSV TYPE 1/2 RADHA IGM     CANCELED: Herpes Simplex Virus 1 and 2 IgG        Reviewed differential.  It still seems likely that symptoms are viral in nature.  The lesion on the right side of the tongue actually looks better.  The lesion that was present on the left tip of the tongue has resolved, there may be a new lesion on the left back of the tongue.  Would anticipate a 7 to 10-day total course, perhaps a few days longer.  So I think she still within the normal range for something like this.    Trial of viscous lidocaine directly to the ulcers to see if this provides any additional relief.  Okay to take Tylenol 500 mg 2 tabs 3 times daily to help with pain.  Discussed ways to stay hydrated, get some nutrition.    We reviewed other systemic diseases that we would consider if she has recurrent aphthous ulcers, which is not currently the situation.    Other skin findings may also point toward viral etiology, specifically coxsackie.    Due to pregnancy status, did recommend HSV testing      BMI  Estimated body mass index is 27.16 kg/m  as calculated from the following:    Height as of 6/7/24:  "1.651 m (5' 5\").    Weight as of this encounter: 74 kg (163 lb 3.2 oz).         No follow-ups on file.    Subjective   Raina is a 34 year old, presenting for the following health issues:  Mouth Problem (Mouth problem, not getting better, Also new symptoms of fatigue and throat hurting more.  Mouthwash didn't help much for the pain. )        7/29/2024    10:00 AM   Additional Questions   Roomed by Elo   Accompanied by self     HPI     Patient is here for follow-up.  She has a ulcer on the right side of her tongue that has been present for the past 5 to 6 days.  She was seen 3 days ago in clinic and we discussed some possible topical treatments.  None of these seem to be working that well.  She wonders if she has a new lesion on the left side of her tongue.  She previously had a lesion on the left front of her tongue, this seems to have resolved.    Yesterday she slept well, took a 2-hour nap, and then felt quite tired later in the day.  She wonders if she maybe has a little bit of a sore throat.  No fevers.  Nobody else has been sick.    No concerns for STDs.        Objective    /62 (BP Location: Right arm, Patient Position: Sitting, Cuff Size: Adult Regular)   Pulse 68   Temp 99.1  F (37.3  C) (Tympanic)   Wt 74 kg (163 lb 3.2 oz)   LMP 04/30/2024 (Approximate)   SpO2 99%   Breastfeeding No   BMI 27.16 kg/m    Body mass index is 27.16 kg/m .  Physical Exam  Constitutional:       Appearance: She is well-developed.   HENT:      Right Ear: External ear normal.      Left Ear: External ear normal.      Mouth/Throat:      Comments: Shallow 1 cm ulcer on right mid tongue.  Distinct edges, no signs of infection.  Previous ulcer on left front of tongue has resolved.  There is a red spot on the left posterior tongue.  Eyes:      General: No scleral icterus.     Conjunctiva/sclera: Conjunctivae normal.   Cardiovascular:      Rate and Rhythm: Normal rate.   Pulmonary:      Effort: Pulmonary effort is " "normal. No respiratory distress.   Skin:     Findings: No rash.      Comments: \"Collection of 3 raised red plaques on left buttock biggest 1 measuring approximately 2 cm.  She has a papule on her left posterior knee that she had not noticed before.   Neurological:      Mental Status: She is alert.       Results for orders placed or performed in visit on 07/29/24   CBC W PLT No Diff     Status: Normal   Result Value Ref Range    WBC Count 7.7 4.0 - 11.0 10e3/uL    RBC Count 4.46 3.80 - 5.20 10e6/uL    Hemoglobin 13.3 11.7 - 15.7 g/dL    Hematocrit 38.5 35.0 - 47.0 %    MCV 86 78 - 100 fL    MCH 29.8 26.5 - 33.0 pg    MCHC 34.5 31.5 - 36.5 g/dL    RDW 11.5 10.0 - 15.0 %    Platelet Count 237 150 - 450 10e3/uL   Comprehensive Metabolic Panel     Status: Normal   Result Value Ref Range    Sodium 137 135 - 145 mmol/L    Potassium 4.1 3.4 - 5.3 mmol/L    Carbon Dioxide (CO2) 23 22 - 29 mmol/L    Anion Gap 12 7 - 15 mmol/L    Urea Nitrogen 10.5 6.0 - 20.0 mg/dL    Creatinine 0.67 0.51 - 0.95 mg/dL    GFR Estimate >90 >60 mL/min/1.73m2    Calcium 9.5 8.8 - 10.4 mg/dL    Chloride 102 98 - 107 mmol/L    Glucose 79 70 - 99 mg/dL    Alkaline Phosphatase 49 40 - 150 U/L    AST 22 0 - 45 U/L    ALT 18 0 - 50 U/L    Protein Total 7.5 6.4 - 8.3 g/dL    Albumin 4.3 3.5 - 5.2 g/dL    Bilirubin Total 0.4 <=1.2 mg/dL   [facility]; [id number]; Herpes Simplex Virus Type 1 and/or 2 Antibodies, IgG and IgM with Reflex to Type 1 and 2 Glycoprotein G-Specific Ab, IgG; HERPR PAN (Laboratory Miscellaneous Order)     Status: None   Result Value Ref Range    See Scanned Result       Specimen received. Reordered and sent to performing laboratory. Report to follow up on completion.    Performing Laboratory [facility] Results     Test Name       [id number] Herpes Simplex Virus Type 1 and/or 2 Antibodies, IgG and IgM with Reflex to Type 1 and 2 Glycoprotein G-Specific Ab, IgG    Test Code [id number]          Signed Electronically by: Lindsey Watts, " MD

## 2024-07-29 NOTE — NURSING NOTE
"Chief Complaint   Patient presents with    Mouth Problem     Mouth problem, not getting better, Also new symptoms of fatigue and throat hurting more.  Mouthwash didn't help much for the pain.        Initial /62 (BP Location: Right arm, Patient Position: Sitting, Cuff Size: Adult Regular)   Pulse 68   Temp 99.1  F (37.3  C) (Tympanic)   Wt 74 kg (163 lb 3.2 oz)   LMP 04/30/2024 (Approximate)   SpO2 99%   Breastfeeding No   BMI 27.16 kg/m   Estimated body mass index is 27.16 kg/m  as calculated from the following:    Height as of 6/7/24: 1.651 m (5' 5\").    Weight as of this encounter: 74 kg (163 lb 3.2 oz).  Medication Reconciliation: complete    Elo Feng LPN    Advance Care Directive reviewed    "

## 2024-07-31 LAB — MISCELLANEOUS TEST 1 (ARUP): NORMAL

## 2024-12-14 ENCOUNTER — OFFICE VISIT (OUTPATIENT)
Dept: FAMILY MEDICINE | Facility: OTHER | Age: 34
End: 2024-12-14
Payer: COMMERCIAL

## 2024-12-14 VITALS
DIASTOLIC BLOOD PRESSURE: 78 MMHG | BODY MASS INDEX: 32.02 KG/M2 | OXYGEN SATURATION: 98 % | HEART RATE: 115 BPM | TEMPERATURE: 97.8 F | WEIGHT: 192.4 LBS | RESPIRATION RATE: 20 BRPM | SYSTOLIC BLOOD PRESSURE: 132 MMHG

## 2024-12-14 DIAGNOSIS — J01.01 ACUTE RECURRENT MAXILLARY SINUSITIS: Primary | ICD-10-CM

## 2024-12-14 DIAGNOSIS — J34.89 STUFFY AND RUNNY NOSE: ICD-10-CM

## 2024-12-14 LAB
FLUAV RNA SPEC QL NAA+PROBE: NEGATIVE
FLUBV RNA RESP QL NAA+PROBE: NEGATIVE
RSV RNA SPEC NAA+PROBE: NEGATIVE
SARS-COV-2 RNA RESP QL NAA+PROBE: NEGATIVE

## 2024-12-14 PROCEDURE — 87637 SARSCOV2&INF A&B&RSV AMP PRB: CPT | Mod: ZL | Performed by: REGISTERED NURSE

## 2024-12-14 PROCEDURE — 99213 OFFICE O/P EST LOW 20 MIN: CPT | Performed by: REGISTERED NURSE

## 2024-12-14 RX ORDER — AMOXICILLIN 875 MG/1
875 TABLET, COATED ORAL 2 TIMES DAILY
Qty: 14 TABLET | Refills: 0 | Status: SHIPPED | OUTPATIENT
Start: 2024-12-14 | End: 2024-12-21

## 2024-12-14 RX ORDER — ASPIRIN 81 MG/1
1 TABLET, COATED ORAL
COMMUNITY
Start: 2024-11-22

## 2024-12-14 RX ORDER — HYDROXYZINE PAMOATE 25 MG/1
CAPSULE ORAL
COMMUNITY
Start: 2024-11-27

## 2024-12-14 RX ORDER — BUSPIRONE HYDROCHLORIDE 5 MG/1
1 TABLET ORAL
COMMUNITY
Start: 2024-11-27

## 2024-12-14 ASSESSMENT — PAIN SCALES - GENERAL: PAINLEVEL_OUTOF10: MODERATE PAIN (5)

## 2024-12-14 NOTE — PROGRESS NOTES
Raina Javier  1990    ASSESSMENT/PLAN:   1. Acute recurrent maxillary sinusitis (Primary)  2. Stuffy and runny nose    - amoxicillin (AMOXIL) 875 MG tablet; Take 1 tablet (875 mg) by mouth 2 times daily for 7 days.  Dispense: 14 tablet; Refill: 0  - Influenza A/B, RSV and SARS-CoV2 PCR (COVID-19) Nose     Viral testing today for influenza, COVID and RSV all returned negative.  Vital signs reassuring.  With history of IgA deficiency and recurrent sinusitis treatment started with a amoxicillin.  Reviewed symptomatic care and discussed emergent signs and symptoms to monitor for and when to seek follow up for any new or worsening symptoms.     Patient agrees with plan of care and verbalizes understating. AVS printed. Patient education provided verbally and written instructions provided as requested.     SUBJECTIVE:   CHIEF COMPLAINT/ REASON FOR VISIT  Patient presents with:  Sinus Problem: Cough runny nose sore throat    started 3-4 days ago     HISTORY OF PRESENT ILLNESS  Raina Javier is a pleasant 34 year old female presents to [facility] clinic today with concerns of cough, sinus congestion, runny nose with purulent discharge and sore throat that started 4 days ago.  Yesterday developed pain in her teeth and frontal sinuses.  She did not sleep last night.  She has a history of recurrent sinusitis.  She has known IgA deficiency.  She was last treated for sinusitis in June.  She has been using a [place] pot at home.  Patient is currently pregnant.      I have reviewed the nursing notes.  I have reviewed allergies, medication list, problem list, and past medical history.    REVIEW OF SYSTEMS  See HPI    VITAL SIGNS  Vitals:    12/14/24 0954   BP: 132/78   BP Location: Left arm   Patient Position: Sitting   Cuff Size: Adult Regular   Pulse: 115   Resp: 20   Temp: 97.8  F (36.6  C)   TempSrc: Temporal   SpO2: 98%   Weight: 87.3 kg (192 lb 6.4 oz)      Body mass index is 32.02 kg/m .    OBJECTIVE:    PHYSICAL EXAM  Physical Exam  Vitals and nursing note reviewed.   Constitutional:       Appearance: Normal appearance. She is not ill-appearing.   HENT:      Right Ear: Tympanic membrane normal.      Left Ear: Tympanic membrane normal.      Nose: Congestion (Green nasal drainage) and rhinorrhea present.      Mouth/Throat:      Pharynx: Posterior oropharyngeal erythema present. No oropharyngeal exudate.   Cardiovascular:      Rate and Rhythm: Regular rhythm. Tachycardia present.      Pulses: Normal pulses.      Heart sounds: Normal heart sounds.   Pulmonary:      Effort: Pulmonary effort is normal.      Breath sounds: Normal breath sounds.   Lymphadenopathy:      Cervical: No cervical adenopathy.   Skin:     General: Skin is warm and dry.      Findings: No rash.   Neurological:      General: No focal deficit present.      Mental Status: She is alert.   Psychiatric:         Mood and Affect: Mood normal.          DIAGNOSTICS  Results for orders placed or performed in visit on 12/14/24   Influenza A/B, RSV and SARS-CoV2 PCR (COVID-19) Nose     Status: Normal    Specimen: Nose; Swab   Result Value Ref Range    Influenza A PCR Negative Negative    Influenza B PCR Negative Negative    RSV PCR Negative Negative    SARS CoV2 PCR Negative Negative    Narrative    Testing was performed using the Xpert Xpress CoV2/Flu/RSV Assay on the [employer] GeneXpert Instrument. This test should be ordered for the detection of SARS-CoV2, influenza, and RSV viruses in individuals with signs and symptoms of respiratory tract infection. This test is for in vitro diagnostic use under the [place] FDA for laboratories certified under CLIA to perform high or moderate complexity testing. This test has been [place] FDA cleared. A negative result does not rule out the presence of PCR inhibitors in the specimen or target RNA in concentration below the limit of detection for the assay. If only one viral target is positive but coinfection with multiple targets is  suspected, the sample should be re-tested with another FDA cleared, approved, or authorized test, if coninfection would change clinical management. This test was validated by the [facility]. These laboratories are certified under the Clinical Laboratory Improvement Amendments of 1988 (CLIA-88) as qualified to perfom high complexity laboratory testing.        FRANK Lozada [facility]

## 2024-12-14 NOTE — NURSING NOTE
"Chief Complaint   Patient presents with    Sinus Problem     Cough runny nose sore throat    started 3-4 days ago       Medication reconciliation completed.    FOOD SECURITY SCREENING QUESTIONS:    The next two questions are to help us understand your food security.  If you are feeling you need any assistance in this area, we have resources available to support you today.    Hunger Vital Signs:  Within the past 12 months we worried whether our food would run out before we got money to buy more. Never  Within the past 12 months the food we bought just didn't last and we didn't have money to get more. Never    Initial /78 (BP Location: Left arm, Patient Position: Sitting, Cuff Size: Adult Regular)   Pulse 115   Temp 97.8  F (36.6  C) (Temporal)   Resp 20   Wt 87.3 kg (192 lb 6.4 oz)   LMP 04/30/2024 (Approximate)   SpO2 98%   BMI 32.02 kg/m   Estimated body mass index is 32.02 kg/m  as calculated from the following:    Height as of 6/7/24: 1.651 m (5' 5\").    Weight as of this encounter: 87.3 kg (192 lb 6.4 oz).       Shellie Haney LPN .......  12/14/2024  9:56 AM    "

## 2025-01-05 DIAGNOSIS — F43.22 ADJUSTMENT DISORDER WITH ANXIOUS MOOD: ICD-10-CM

## 2025-01-07 RX ORDER — CITALOPRAM HYDROBROMIDE 20 MG/1
20 TABLET ORAL DAILY
Qty: 90 TABLET | Refills: 0 | Status: SHIPPED | OUTPATIENT
Start: 2025-01-07

## 2025-01-07 NOTE — TELEPHONE ENCOUNTER
[id number] of [place] sent Rx request for the following:      Requested Prescriptions   Pending Prescriptions Disp Refills    citalopram (CELEXA) 20 MG tablet [Pharmacy Med Name: CITALOPRAM 20MG TABLET] 90 tablet 3     Sig: TAKE 1 TABLET (20 MG) BY MOUTH DAILY       SSRIs Protocol Failed - 1/7/2025  3:09 PM        Failed - No active pregnancy on record     Last Prescription Date:   1/5/24  Last Fill Qty/Refills:         90, R-3    Last Office Visit:                7/29/24 1/5/24 (Px)    Future Office visit:           None    Pt due for annual exam. Routing to provider for refill consideration. Routing to Unit scheduling pool, to assist Pt in scheduling appointment. Unable to complete prescription refill per RN Medication Refill Policy. Keesha Delgado RN .............. 1/7/2025  3:21 PM

## 2025-01-29 ENCOUNTER — TRANSFERRED RECORDS (OUTPATIENT)
Dept: HEALTH INFORMATION MANAGEMENT | Facility: OTHER | Age: 35
End: 2025-01-29
Payer: COMMERCIAL

## 2025-02-10 ENCOUNTER — MEDICAL CORRESPONDENCE (OUTPATIENT)
Dept: HEALTH INFORMATION MANAGEMENT | Facility: OTHER | Age: 35
End: 2025-02-10
Payer: COMMERCIAL

## 2025-03-03 ENCOUNTER — MEDICAL CORRESPONDENCE (OUTPATIENT)
Dept: HEALTH INFORMATION MANAGEMENT | Facility: OTHER | Age: 35
End: 2025-03-03
Payer: COMMERCIAL

## 2025-04-01 SDOH — HEALTH STABILITY: PHYSICAL HEALTH: ON AVERAGE, HOW MANY MINUTES DO YOU ENGAGE IN EXERCISE AT THIS LEVEL?: 20 MIN

## 2025-04-01 SDOH — HEALTH STABILITY: PHYSICAL HEALTH: ON AVERAGE, HOW MANY DAYS PER WEEK DO YOU ENGAGE IN MODERATE TO STRENUOUS EXERCISE (LIKE A BRISK WALK)?: 4 DAYS

## 2025-04-01 ASSESSMENT — ASTHMA QUESTIONNAIRES
QUESTION_1 LAST FOUR WEEKS HOW MUCH OF THE TIME DID YOUR ASTHMA KEEP YOU FROM GETTING AS MUCH DONE AT WORK, SCHOOL OR AT HOME: NONE OF THE TIME
QUESTION_3 LAST FOUR WEEKS HOW OFTEN DID YOUR ASTHMA SYMPTOMS (WHEEZING, COUGHING, SHORTNESS OF BREATH, CHEST TIGHTNESS OR PAIN) WAKE YOU UP AT NIGHT OR EARLIER THAN USUAL IN THE MORNING: NOT AT ALL
QUESTION_2 LAST FOUR WEEKS HOW OFTEN HAVE YOU HAD SHORTNESS OF BREATH: NOT AT ALL
QUESTION_4 LAST FOUR WEEKS HOW OFTEN HAVE YOU USED YOUR RESCUE INHALER OR NEBULIZER MEDICATION (SUCH AS ALBUTEROL): NOT AT ALL
QUESTION_5 LAST FOUR WEEKS HOW WOULD YOU RATE YOUR ASTHMA CONTROL: COMPLETELY CONTROLLED
ACT_TOTALSCORE: 25

## 2025-04-01 ASSESSMENT — SOCIAL DETERMINANTS OF HEALTH (SDOH): HOW OFTEN DO YOU GET TOGETHER WITH FRIENDS OR RELATIVES?: ONCE A WEEK

## 2025-04-02 ENCOUNTER — OFFICE VISIT (OUTPATIENT)
Dept: FAMILY MEDICINE | Facility: OTHER | Age: 35
End: 2025-04-02
Attending: STUDENT IN AN ORGANIZED HEALTH CARE EDUCATION/TRAINING PROGRAM
Payer: COMMERCIAL

## 2025-04-02 VITALS
HEIGHT: 65 IN | TEMPERATURE: 97.2 F | RESPIRATION RATE: 18 BRPM | BODY MASS INDEX: 28.99 KG/M2 | SYSTOLIC BLOOD PRESSURE: 118 MMHG | WEIGHT: 174 LBS | HEART RATE: 77 BPM | OXYGEN SATURATION: 94 % | DIASTOLIC BLOOD PRESSURE: 70 MMHG

## 2025-04-02 DIAGNOSIS — R74.01 ELEVATED ALT MEASUREMENT: ICD-10-CM

## 2025-04-02 DIAGNOSIS — F43.22 ADJUSTMENT DISORDER WITH ANXIOUS MOOD: ICD-10-CM

## 2025-04-02 DIAGNOSIS — Z00.00 ROUTINE GENERAL MEDICAL EXAMINATION AT A HEALTH CARE FACILITY: Primary | ICD-10-CM

## 2025-04-02 LAB
ALBUMIN SERPL BCG-MCNC: 4.7 G/DL (ref 3.5–5.2)
ALP SERPL-CCNC: 111 U/L (ref 40–150)
ALT SERPL W P-5'-P-CCNC: 55 U/L (ref 0–50)
ANION GAP SERPL CALCULATED.3IONS-SCNC: 13 MMOL/L (ref 7–15)
AST SERPL W P-5'-P-CCNC: 45 U/L (ref 0–45)
BASOPHILS # BLD AUTO: 0 10E3/UL (ref 0–0.2)
BASOPHILS NFR BLD AUTO: 1 %
BILIRUB SERPL-MCNC: 0.3 MG/DL
BUN SERPL-MCNC: 15.1 MG/DL (ref 6–20)
CALCIUM SERPL-MCNC: 9.7 MG/DL (ref 8.8–10.4)
CHLORIDE SERPL-SCNC: 102 MMOL/L (ref 98–107)
CREAT SERPL-MCNC: 0.76 MG/DL (ref 0.51–0.95)
EGFRCR SERPLBLD CKD-EPI 2021: >90 ML/MIN/1.73M2
EOSINOPHIL # BLD AUTO: 0.1 10E3/UL (ref 0–0.7)
EOSINOPHIL NFR BLD AUTO: 2 %
ERYTHROCYTE [DISTWIDTH] IN BLOOD BY AUTOMATED COUNT: 12.5 % (ref 10–15)
GLUCOSE SERPL-MCNC: 98 MG/DL (ref 70–99)
HCO3 SERPL-SCNC: 23 MMOL/L (ref 22–29)
HCT VFR BLD AUTO: 39.2 % (ref 35–47)
HGB BLD-MCNC: 13 G/DL (ref 11.7–15.7)
IMM GRANULOCYTES # BLD: 0 10E3/UL
IMM GRANULOCYTES NFR BLD: 0 %
LYMPHOCYTES # BLD AUTO: 2.7 10E3/UL (ref 0.8–5.3)
LYMPHOCYTES NFR BLD AUTO: 33 %
MCH RBC QN AUTO: 28.6 PG (ref 26.5–33)
MCHC RBC AUTO-ENTMCNC: 33.2 G/DL (ref 31.5–36.5)
MCV RBC AUTO: 86 FL (ref 78–100)
MONOCYTES # BLD AUTO: 0.4 10E3/UL (ref 0–1.3)
MONOCYTES NFR BLD AUTO: 5 %
NEUTROPHILS # BLD AUTO: 4.8 10E3/UL (ref 1.6–8.3)
NEUTROPHILS NFR BLD AUTO: 59 %
NRBC # BLD AUTO: 0 10E3/UL
NRBC BLD AUTO-RTO: 0 /100
PLATELET # BLD AUTO: 314 10E3/UL (ref 150–450)
POTASSIUM SERPL-SCNC: 4.4 MMOL/L (ref 3.4–5.3)
PROT SERPL-MCNC: 7.9 G/DL (ref 6.4–8.3)
RBC # BLD AUTO: 4.54 10E6/UL (ref 3.8–5.2)
SODIUM SERPL-SCNC: 138 MMOL/L (ref 135–145)
T4 FREE SERPL-MCNC: 0.92 NG/DL (ref 0.9–1.7)
TSH SERPL DL<=0.005 MIU/L-ACNC: 1.32 UIU/ML (ref 0.3–4.2)
WBC # BLD AUTO: 8.2 10E3/UL (ref 4–11)

## 2025-04-02 PROCEDURE — 84155 ASSAY OF PROTEIN SERUM: CPT | Mod: ZL | Performed by: STUDENT IN AN ORGANIZED HEALTH CARE EDUCATION/TRAINING PROGRAM

## 2025-04-02 PROCEDURE — 86706 HEP B SURFACE ANTIBODY: CPT | Mod: ZL | Performed by: STUDENT IN AN ORGANIZED HEALTH CARE EDUCATION/TRAINING PROGRAM

## 2025-04-02 PROCEDURE — 36415 COLL VENOUS BLD VENIPUNCTURE: CPT | Mod: ZL | Performed by: STUDENT IN AN ORGANIZED HEALTH CARE EDUCATION/TRAINING PROGRAM

## 2025-04-02 PROCEDURE — 84443 ASSAY THYROID STIM HORMONE: CPT | Mod: ZL | Performed by: STUDENT IN AN ORGANIZED HEALTH CARE EDUCATION/TRAINING PROGRAM

## 2025-04-02 PROCEDURE — 82310 ASSAY OF CALCIUM: CPT | Mod: ZL | Performed by: STUDENT IN AN ORGANIZED HEALTH CARE EDUCATION/TRAINING PROGRAM

## 2025-04-02 PROCEDURE — 84439 ASSAY OF FREE THYROXINE: CPT | Mod: ZL | Performed by: STUDENT IN AN ORGANIZED HEALTH CARE EDUCATION/TRAINING PROGRAM

## 2025-04-02 PROCEDURE — 85018 HEMOGLOBIN: CPT | Mod: ZL | Performed by: STUDENT IN AN ORGANIZED HEALTH CARE EDUCATION/TRAINING PROGRAM

## 2025-04-02 PROCEDURE — 86803 HEPATITIS C AB TEST: CPT | Mod: ZL | Performed by: STUDENT IN AN ORGANIZED HEALTH CARE EDUCATION/TRAINING PROGRAM

## 2025-04-02 PROCEDURE — 85004 AUTOMATED DIFF WBC COUNT: CPT | Mod: ZL | Performed by: STUDENT IN AN ORGANIZED HEALTH CARE EDUCATION/TRAINING PROGRAM

## 2025-04-02 PROCEDURE — 87340 HEPATITIS B SURFACE AG IA: CPT | Mod: ZL | Performed by: STUDENT IN AN ORGANIZED HEALTH CARE EDUCATION/TRAINING PROGRAM

## 2025-04-02 PROCEDURE — 82040 ASSAY OF SERUM ALBUMIN: CPT | Mod: ZL | Performed by: STUDENT IN AN ORGANIZED HEALTH CARE EDUCATION/TRAINING PROGRAM

## 2025-04-02 PROCEDURE — 82247 BILIRUBIN TOTAL: CPT | Mod: ZL | Performed by: STUDENT IN AN ORGANIZED HEALTH CARE EDUCATION/TRAINING PROGRAM

## 2025-04-02 RX ORDER — CITALOPRAM HYDROBROMIDE 20 MG/1
30 TABLET ORAL DAILY
Qty: 120 TABLET | Refills: 4 | Status: SHIPPED | OUTPATIENT
Start: 2025-04-02

## 2025-04-02 RX ORDER — HYDROXYZINE PAMOATE 25 MG/1
25 CAPSULE ORAL 4 TIMES DAILY PRN
Qty: 90 CAPSULE | Refills: 4 | Status: SHIPPED | OUTPATIENT
Start: 2025-04-02

## 2025-04-02 RX ORDER — BUSPIRONE HYDROCHLORIDE 5 MG/1
5 TABLET ORAL
Qty: 90 TABLET | Refills: 4 | Status: SHIPPED | OUTPATIENT
Start: 2025-04-02

## 2025-04-02 ASSESSMENT — PAIN SCALES - GENERAL: PAINLEVEL_OUTOF10: NO PAIN (0)

## 2025-04-02 NOTE — PROGRESS NOTES
"Preventive Care Visit  [facility]  Lucy Salazar MD, Family Medicine  Apr 2, 2025      Assessment & Plan     Routine general medical examination at a health care facility  - HPV and Gynecologic Cytology Panel - Recommended Age 30 - 65 Years  - CBC and Differential; Future  - Comprehensive Metabolic Panel; Future  - TSH Reflex GH; Future  - T4, Free; Future  - Thyroid peroxidase antibody; Future  - CBC and Differential  - Comprehensive Metabolic Panel  - T4, Free  - Thyroid peroxidase antibody  - TSH  - Gynecologic Cytology (PAP)    Adjustment disorder with anxious mood  Stable, refilled  - citalopram (CELEXA) 20 MG tablet; Take 1.5 tablets (30 mg) by mouth daily.  - busPIRone (BUSPAR) 5 MG tablet; Take 1 tablet (5 mg) by mouth 2 times daily.  - hydrOXYzine delaney (VISTARIL) 25 MG capsule; Take 1 capsule (25 mg) by mouth 4 times daily as needed for itching.    Elevated ALT measurement  Has been elevated in the past. Will get abdominal US, last one was in 2017. Hepatitis labs added on  - US Abdomen Limited; Future  - Hepatitis B Surface Antigen; Future  - Hepatitis B Surface Antibody; Future  - Hepatitis C Screen Reflex to HCV RNA Quant and Genotype; Future  - Hepatitis B Surface Antigen            BMI  Estimated body mass index is 29.18 kg/m  as calculated from the following:    Height as of this encounter: 1.645 m (5' 4.75\").    Weight as of this encounter: 78.9 kg (174 lb).       Counseling  Appropriate preventive services were addressed with this patient via screening, questionnaire, or discussion as appropriate for fall prevention, nutrition, physical activity, Tobacco-use cessation, social engagement, weight loss and cognition.  Checklist reviewing preventive services available has been given to the patient.  Reviewed patient's diet, addressing concerns and/or questions.           Return in about 53 weeks (around 4/8/2026) for Annual Wellness Visit.    Jabari Paris is a 35 " year old, presenting for the following:  Physical (Annual exam)        3/5/2025     4:03 PM   Additional Questions   Roomed by Larissa SCHULZ LPN          Healthy Habits:     Taking medications regularly:  1    Barriers to taking medications:  Remembering to take  History of Present Illness       Reason for visit:  Annual Preventative Wellcheck and pap She is missing 1 dose(s) of medications per week.  She is not taking prescribed medications regularly due to remembering to take.    Here for an annual physical   Due for repeat pap due to positive HPV last year  Med refills-- mood stable if not improved since pregnancy   Recent mastitis x2-- is breastfeeding          Advance Care Planning  Patient does not have a Health Care Directive: Discussed advance care planning with patient; however, patient declined at this time.      4/1/2025   General Health   How would you rate your overall physical health? (!) FAIR   Feel stress (tense, anxious, or unable to sleep) Not at all         4/1/2025   Nutrition   Three or more servings of calcium each day? Yes   Diet: Regular (no restrictions)   How many servings of fruit and vegetables per day? (!) 2-3   How many sweetened beverages each day? 0-1         4/1/2025   Exercise   Days per week of moderate/strenous exercise 4 days   Average minutes spent exercising at this level 20 min         4/1/2025   Social Factors   Frequency of gathering with friends or relatives Once a week   Worry food won't last until get money to buy more No   Food not last or not have enough money for food? No   Do you have housing? (Housing is defined as stable permanent housing and does not include staying ouside in a car, in a tent, in an abandoned building, in an overnight shelter, or couch-surfing.) Yes   Are you worried about losing your housing? No   Lack of transportation? No   Unable to get utilities (heat,electricity)? No         4/1/2025   Dental   Dentist two times every year? Yes           Today's  "PHQ-2 Score:       4/1/2025     7:58 PM   PHQ-2 ( 1999 [employer])   Q1: Little interest or pleasure in doing things 0   Q2: Feeling down, depressed or hopeless 1   PHQ-2 Score 1    Q1: Little interest or pleasure in doing things Not at all   Q2: Feeling down, depressed or hopeless Several days   PHQ-2 Score 1       Patient-reported           4/1/2025   Substance Use   Alcohol more than 3/day or more than 7/wk No   Do you use any other substances recreationally? No     Social History     Tobacco Use    Smoking status: Never     Passive exposure: Never    Smokeless tobacco: Never   Vaping Use    Vaping status: Never Used   Substance Use Topics    Alcohol use: Yes    Drug use: Never          Mammogram Screening - Patient under 40 years of age: Routine Mammogram Screening not recommended.         4/1/2025   STI Screening   New sexual partner(s) since last STI/HIV test? No     History of abnormal Pap smear: YES - reflected in Problem List and Health Maintenance accordingly        Latest Ref Rng & Units 1/5/2024     3:45 PM 2/11/2021     4:14 PM   PAP / HPV   PAP  Negative for Intraepithelial Lesion or Malignancy (NILM)     HPV 16 DNA Negative Negative     HPV 18 DNA Negative Negative     Other HR HPV Negative Positive     PAP-ABSTRACT   See Scanned Document           This result is from an external source.           4/1/2025   Contraception/Family Planning   Questions about contraception or family planning No        Reviewed and updated as needed this visit by Provider                    Lab work is in process      Review of Systems  Constitutional, HEENT, cardiovascular, pulmonary, gi and gu systems are negative, except as otherwise noted.     Objective    Exam  /70 (BP Location: Right arm, Patient Position: Sitting, Cuff Size: Adult Regular)   Pulse 77   Temp 97.2  F (36.2  C) (Tympanic)   Resp 18   Ht 1.645 m (5' 4.75\")   Wt 78.9 kg (174 lb)   LMP 04/30/2024 (Approximate)   SpO2 94%   Breastfeeding Yes   " "BMI 29.18 kg/m     Estimated body mass index is 29.18 kg/m  as calculated from the following:    Height as of this encounter: 1.645 m (5' 4.75\").    Weight as of this encounter: 78.9 kg (174 lb).    Physical Exam  GENERAL: alert and no distress  EYES: Eyes grossly normal to inspection, PERRL and conjunctivae and sclerae normal  HENT: ear canals and TM's normal, nose and mouth without ulcers or lesions  NECK: no adenopathy, no asymmetry, masses, or scars  RESP: lungs clear to auscultation - no rales, rhonchi or wheezes  BREAST: normal without masses, tenderness or nipple discharge and no palpable axillary masses or adenopathy  CV: regular rate and rhythm, normal S1 S2, no S3 or S4, no murmur, click or rub, no peripheral edema    (female): normal female external genitalia, normal urethral meatus , normal vaginal mucosa, and normal cervix, adnexae, and uterus without masses.  PSYCH: mentation appears normal, affect normal/bright  SKIN: scattered brown macules         Signed Electronically by: Lucy Salazar MD    "

## 2025-04-02 NOTE — NURSING NOTE
"Chief Complaint   Patient presents with    Physical     Annual exam       Initial /70 (BP Location: Right arm, Patient Position: Sitting, Cuff Size: Adult Regular)   Pulse 77   Temp 97.2  F (36.2  C) (Tympanic)   Resp 18   Ht 1.645 m (5' 4.75\")   Wt 78.9 kg (174 lb)   LMP 04/30/2024 (Approximate)   SpO2 94%   Breastfeeding Yes   BMI 29.18 kg/m   Estimated body mass index is 29.18 kg/m  as calculated from the following:    Height as of this encounter: 1.645 m (5' 4.75\").    Weight as of this encounter: 78.9 kg (174 lb).  Medication Review: complete    The next two questions are to help us understand your food security.  If you are feeling you need any assistance in this area, we have resources available to support you today.          4/1/2025   SDOH- Food Insecurity   Within the past 12 months, did you worry that your food would run out before you got money to buy more? N   Within the past 12 months, did the food you bought just not last and you didn t have money to get more? N         Health Care Directive:  Patient does not have a Health Care Directive: Discussed advance care planning with patient; however, patient declined at this time.    Keesha Love LPN      "

## 2025-04-02 NOTE — PATIENT INSTRUCTIONS
Patient Education   Preventive Care Advice   This is general advice given by our system to help you stay healthy. However, your care team may have specific advice just for you. Please talk to your care team about your preventive care needs.  Nutrition  Eat 5 or more servings of fruits and vegetables each day.  Try wheat bread, brown rice and whole grain pasta (instead of white bread, rice, and pasta).  Get enough calcium and vitamin D. Check the label on foods and aim for 100% of the RDA (recommended daily allowance).  Lifestyle  Exercise at least 150 minutes each week  (30 minutes a day, 5 days a week).  Do muscle strengthening activities 2 days a week. These help control your weight and prevent disease.  No smoking.  Wear sunscreen to prevent skin cancer.  Have a dental exam and cleaning every 6 months.  Yearly exams  See your health care team every year to talk about:  Any changes in your health.  Any medicines your care team has prescribed.  Preventive care, family planning, and ways to prevent chronic diseases.  Shots (vaccines)   HPV shots (up to age 26), if you've never had them before.  Hepatitis B shots (up to age 59), if you've never had them before.  COVID-19 shot: Get this shot when it's due.  Flu shot: Get a flu shot every year.  Tetanus shot: Get a tetanus shot every 10 years.  Pneumococcal, hepatitis A, and RSV shots: Ask your care team if you need these based on your risk.  Shingles shot (for age 50 and up)  General health tests  Diabetes screening:  Starting at age 35, Get screened for diabetes at least every 3 years.  If you are younger than age 35, ask your care team if you should be screened for diabetes.  Cholesterol test: At age 39, start having a cholesterol test every 5 years, or more often if advised.  Bone density scan (DEXA): At age 50, ask your care team if you should have this scan for osteoporosis (brittle bones).  Hepatitis C: Get tested at least once in your life.  STIs (sexually  transmitted infections)  Before age 24: Ask your care team if you should be screened for STIs.  After age 24: Get screened for STIs if you're at risk. You are at risk for STIs (including HIV) if:  You are sexually active with more than one person.  You don't use condoms every time.  You or a partner was diagnosed with a sexually transmitted infection.  If you are at risk for HIV, ask about PrEP medicine to prevent HIV.  Get tested for HIV at least once in your life, whether you are at risk for HIV or not.  Cancer screening tests  Cervical cancer screening: If you have a cervix, begin getting regular cervical cancer screening tests starting at age 21.  Breast cancer scan (mammogram): If you've ever had breasts, begin having regular mammograms starting at age 40. This is a scan to check for breast cancer.  Colon cancer screening: It is important to start screening for colon cancer at age 45.  Have a colonoscopy test every 10 years (or more often if you're at risk) Or, ask your provider about stool tests like a FIT test every year or Cologuard test every 3 years.  To learn more about your testing options, visit:   .  For help making a decision, visit:   https://bit.ly/bm02403.  Prostate cancer screening test: If you have a prostate, ask your care team if a prostate cancer screening test (PSA) at age 55 is right for you.  Lung cancer screening: If you are a current or former smoker ages 50 to 80, ask your care team if ongoing lung cancer screenings are right for you.  For informational purposes only. Not to replace the advice of your health care provider. Copyright   2023 [employer]. All rights reserved. Clinically reviewed by the [facility] Transitions Program. [employer] [id number] - REV 01/24.

## 2025-04-03 ENCOUNTER — MEDICAL CORRESPONDENCE (OUTPATIENT)
Dept: HEALTH INFORMATION MANAGEMENT | Facility: OTHER | Age: 35
End: 2025-04-03
Payer: COMMERCIAL

## 2025-04-03 LAB
HBV SURFACE AB SERPL IA-ACNC: >1000 M[IU]/ML
HBV SURFACE AB SERPL IA-ACNC: REACTIVE M[IU]/ML
HBV SURFACE AG SERPL QL IA: NONREACTIVE
HCV AB SERPL QL IA: NONREACTIVE

## 2025-04-07 LAB
BKR AP ASSOCIATED HPV REPORT: NORMAL
BKR LAB AP GYN ADEQUACY: NORMAL
BKR LAB AP GYN INTERPRETATION: NORMAL
BKR LAB AP LMP: NORMAL
BKR LAB AP PREVIOUS ABNORMAL: NORMAL
PATH REPORT.COMMENTS IMP SPEC: NORMAL
PATH REPORT.COMMENTS IMP SPEC: NORMAL
PATH REPORT.RELEVANT HX SPEC: NORMAL

## 2025-04-18 ENCOUNTER — HOSPITAL ENCOUNTER (OUTPATIENT)
Dept: ULTRASOUND IMAGING | Facility: OTHER | Age: 35
Discharge: HOME | End: 2025-04-18
Attending: STUDENT IN AN ORGANIZED HEALTH CARE EDUCATION/TRAINING PROGRAM | Admitting: STUDENT IN AN ORGANIZED HEALTH CARE EDUCATION/TRAINING PROGRAM
Payer: COMMERCIAL

## 2025-04-18 DIAGNOSIS — R74.01 ELEVATED ALT MEASUREMENT: ICD-10-CM

## 2025-04-18 PROCEDURE — 76705 ECHO EXAM OF ABDOMEN: CPT | Mod: 26 | Performed by: RADIOLOGY

## 2025-04-18 PROCEDURE — 76705 ECHO EXAM OF ABDOMEN: CPT

## 2025-04-20 ENCOUNTER — HEALTH MAINTENANCE LETTER (OUTPATIENT)
Age: 35
End: 2025-04-20

## 2025-06-20 ENCOUNTER — LAB (OUTPATIENT)
Dept: LAB | Facility: OTHER | Age: 35
End: 2025-06-20
Attending: STUDENT IN AN ORGANIZED HEALTH CARE EDUCATION/TRAINING PROGRAM
Payer: COMMERCIAL

## 2025-06-20 ENCOUNTER — RESULTS FOLLOW-UP (OUTPATIENT)
Dept: FAMILY MEDICINE | Facility: OTHER | Age: 35
End: 2025-06-20

## 2025-06-20 DIAGNOSIS — R74.01 ELEVATED ALT MEASUREMENT: ICD-10-CM

## 2025-06-20 LAB
ALBUMIN SERPL BCG-MCNC: 4.7 G/DL (ref 3.5–5.2)
ALP SERPL-CCNC: 108 U/L (ref 40–150)
ALT SERPL W P-5'-P-CCNC: 105 U/L (ref 0–50)
ANION GAP SERPL CALCULATED.3IONS-SCNC: 10 MMOL/L (ref 7–15)
AST SERPL W P-5'-P-CCNC: 55 U/L (ref 0–45)
BILIRUB SERPL-MCNC: 0.2 MG/DL
BUN SERPL-MCNC: 18.8 MG/DL (ref 6–20)
CALCIUM SERPL-MCNC: 9.7 MG/DL (ref 8.8–10.4)
CHLORIDE SERPL-SCNC: 102 MMOL/L (ref 98–107)
CREAT SERPL-MCNC: 0.82 MG/DL (ref 0.51–0.95)
EGFRCR SERPLBLD CKD-EPI 2021: >90 ML/MIN/1.73M2
FERRITIN SERPL-MCNC: 29 NG/ML (ref 6–175)
GLUCOSE SERPL-MCNC: 99 MG/DL (ref 70–99)
HCO3 SERPL-SCNC: 25 MMOL/L (ref 22–29)
IRON BINDING CAPACITY (ROCHE): 323 UG/DL (ref 240–430)
IRON SATN MFR SERPL: 24 % (ref 15–46)
IRON SERPL-MCNC: 76 UG/DL (ref 37–145)
POTASSIUM SERPL-SCNC: 4.3 MMOL/L (ref 3.4–5.3)
PROT SERPL-MCNC: 7.9 G/DL (ref 6.4–8.3)
SODIUM SERPL-SCNC: 137 MMOL/L (ref 135–145)

## 2025-06-20 PROCEDURE — 36415 COLL VENOUS BLD VENIPUNCTURE: CPT | Mod: ZL

## 2025-06-20 PROCEDURE — 82728 ASSAY OF FERRITIN: CPT | Mod: ZL

## 2025-06-20 PROCEDURE — 80053 COMPREHEN METABOLIC PANEL: CPT | Mod: ZL

## 2025-06-20 PROCEDURE — 83550 IRON BINDING TEST: CPT | Mod: ZL

## 2025-06-23 NOTE — TELEPHONE ENCOUNTER
S/s would be pain in that area, juandice etc.   Just monitoring labs (since they were going up) about every 6 months   For now lets leave the meds as is!

## 2025-06-23 NOTE — TELEPHONE ENCOUNTER
Wondering what s/s she would have?   What would she be monitoring for?  Does not want to stop meds due to being way too emotional post partum.     My Thoughts:  Typically, people do not show signs/symptoms of elevated liver enzymes. It depends on what is causing the elevated liver enzymes. Some people will develop yellowing of the skin or sclera of the eyes.  We would be monitoring your labs again in the near future.       Routing to provider to review and respond.  Rian Aponte RN on 6/23/2025 at 8:50 AM

## 2025-07-11 ENCOUNTER — HOSPITAL ENCOUNTER (OUTPATIENT)
Dept: GENERAL RADIOLOGY | Facility: OTHER | Age: 35
Discharge: HOME | End: 2025-07-11
Payer: COMMERCIAL

## 2025-07-11 PROCEDURE — 71046 X-RAY EXAM CHEST 2 VIEWS: CPT

## 2025-07-11 PROCEDURE — 71046 X-RAY EXAM CHEST 2 VIEWS: CPT | Mod: 26 | Performed by: STUDENT IN AN ORGANIZED HEALTH CARE EDUCATION/TRAINING PROGRAM

## 2025-07-21 ENCOUNTER — OFFICE VISIT (OUTPATIENT)
Dept: BEHAVIORAL HEALTH | Facility: OTHER | Age: 35
End: 2025-07-21
Attending: COUNSELOR
Payer: COMMERCIAL

## 2025-07-21 DIAGNOSIS — F43.25 ADJUSTMENT DISORDER WITH MIXED DISTURBANCE OF EMOTIONS AND CONDUCT: Primary | ICD-10-CM

## 2025-07-21 DIAGNOSIS — F90.2 ATTENTION DEFICIT HYPERACTIVITY DISORDER (ADHD), COMBINED TYPE: ICD-10-CM

## 2025-07-21 ASSESSMENT — PATIENT HEALTH QUESTIONNAIRE - PHQ9
SUM OF ALL RESPONSES TO PHQ QUESTIONS 1-9: 8
10. IF YOU CHECKED OFF ANY PROBLEMS, HOW DIFFICULT HAVE THESE PROBLEMS MADE IT FOR YOU TO DO YOUR WORK, TAKE CARE OF THINGS AT HOME, OR GET ALONG WITH OTHER PEOPLE: SOMEWHAT DIFFICULT
SUM OF ALL RESPONSES TO PHQ QUESTIONS 1-9: 8
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
## 2025-07-22 ENCOUNTER — MYC MEDICAL ADVICE (OUTPATIENT)
Dept: FAMILY MEDICINE | Facility: OTHER | Age: 35
End: 2025-07-22
Payer: COMMERCIAL

## 2025-07-22 DIAGNOSIS — R74.01 ELEVATED ALT MEASUREMENT: Primary | ICD-10-CM

## 2025-07-28 NOTE — TELEPHONE ENCOUNTER
Pt said you can send the GI referral to [place] for the elevated Liver enzymes.     Javier'd up referral.     Routing to provider to review and respond.  Rian Aponte RN on 7/28/2025 at 4:25 PM

## 2025-08-01 ENCOUNTER — MEDICAL CORRESPONDENCE (OUTPATIENT)
Dept: HEALTH INFORMATION MANAGEMENT | Facility: OTHER | Age: 35
End: 2025-08-01
Payer: COMMERCIAL

## 2025-08-06 ENCOUNTER — OFFICE VISIT (OUTPATIENT)
Dept: BEHAVIORAL HEALTH | Facility: OTHER | Age: 35
End: 2025-08-06
Attending: COUNSELOR
Payer: COMMERCIAL

## 2025-08-06 DIAGNOSIS — F43.22 ADJUSTMENT DISORDER WITH ANXIETY: ICD-10-CM

## 2025-08-06 DIAGNOSIS — F90.2 ATTENTION DEFICIT HYPERACTIVITY DISORDER, COMBINED TYPE: Primary | ICD-10-CM

## 2025-08-06 DIAGNOSIS — F63.9 IMPULSE CONTROL DISORDER: ICD-10-CM

## 2025-08-06 DIAGNOSIS — Z63.0 MARITAL CONFLICT: ICD-10-CM

## 2025-08-06 SDOH — SOCIAL STABILITY - SOCIAL INSECURITY: PROBLEMS IN RELATIONSHIP WITH SPOUSE OR PARTNER: Z63.0

## 2025-08-20 ENCOUNTER — OFFICE VISIT (OUTPATIENT)
Dept: PSYCHIATRY | Facility: OTHER | Age: 35
End: 2025-08-20
Attending: NURSE PRACTITIONER
Payer: COMMERCIAL

## 2025-08-20 VITALS
SYSTOLIC BLOOD PRESSURE: 129 MMHG | DIASTOLIC BLOOD PRESSURE: 82 MMHG | HEART RATE: 98 BPM | WEIGHT: 182.8 LBS | OXYGEN SATURATION: 97 % | TEMPERATURE: 99.5 F | BODY MASS INDEX: 30.46 KG/M2 | HEIGHT: 65 IN | RESPIRATION RATE: 20 BRPM

## 2025-08-20 DIAGNOSIS — F32.A DEPRESSION, UNSPECIFIED DEPRESSION TYPE: ICD-10-CM

## 2025-08-20 DIAGNOSIS — F41.9 ANXIETY: Primary | ICD-10-CM

## 2025-08-20 RX ORDER — FLUOXETINE 10 MG/1
10 CAPSULE ORAL DAILY
Qty: 30 CAPSULE | Refills: 1 | Status: SHIPPED | OUTPATIENT
Start: 2025-08-20

## 2025-08-20 ASSESSMENT — ANXIETY QUESTIONNAIRES
8. IF YOU CHECKED OFF ANY PROBLEMS, HOW DIFFICULT HAVE THESE MADE IT FOR YOU TO DO YOUR WORK, TAKE CARE OF THINGS AT HOME, OR GET ALONG WITH OTHER PEOPLE?: SOMEWHAT DIFFICULT
3. WORRYING TOO MUCH ABOUT DIFFERENT THINGS: NOT AT ALL
5. BEING SO RESTLESS THAT IT IS HARD TO SIT STILL: NOT AT ALL
4. TROUBLE RELAXING: NOT AT ALL
7. FEELING AFRAID AS IF SOMETHING AWFUL MIGHT HAPPEN: NOT AT ALL
GAD7 TOTAL SCORE: 3
6. BECOMING EASILY ANNOYED OR IRRITABLE: NEARLY EVERY DAY
GAD7 TOTAL SCORE: 3
7. FEELING AFRAID AS IF SOMETHING AWFUL MIGHT HAPPEN: NOT AT ALL
1. FEELING NERVOUS, ANXIOUS, OR ON EDGE: NOT AT ALL
GAD7 TOTAL SCORE: 3
IF YOU CHECKED OFF ANY PROBLEMS ON THIS QUESTIONNAIRE, HOW DIFFICULT HAVE THESE PROBLEMS MADE IT FOR YOU TO DO YOUR WORK, TAKE CARE OF THINGS AT HOME, OR GET ALONG WITH OTHER PEOPLE: SOMEWHAT DIFFICULT
2. NOT BEING ABLE TO STOP OR CONTROL WORRYING: NOT AT ALL

## 2025-08-20 ASSESSMENT — PATIENT HEALTH QUESTIONNAIRE - PHQ9
SUM OF ALL RESPONSES TO PHQ QUESTIONS 1-9: 6
10. IF YOU CHECKED OFF ANY PROBLEMS, HOW DIFFICULT HAVE THESE PROBLEMS MADE IT FOR YOU TO DO YOUR WORK, TAKE CARE OF THINGS AT HOME, OR GET ALONG WITH OTHER PEOPLE: VERY DIFFICULT
SUM OF ALL RESPONSES TO PHQ QUESTIONS 1-9: 6

## 2025-08-20 ASSESSMENT — PAIN SCALES - GENERAL: PAINLEVEL_OUTOF10: NO PAIN (0)

## 2025-09-04 ENCOUNTER — OFFICE VISIT (OUTPATIENT)
Dept: PSYCHIATRY | Facility: OTHER | Age: 35
End: 2025-09-04
Attending: NURSE PRACTITIONER
Payer: COMMERCIAL

## 2025-09-04 VITALS
SYSTOLIC BLOOD PRESSURE: 123 MMHG | OXYGEN SATURATION: 98 % | TEMPERATURE: 97.7 F | BODY MASS INDEX: 30.29 KG/M2 | HEART RATE: 66 BPM | RESPIRATION RATE: 12 BRPM | WEIGHT: 182 LBS | DIASTOLIC BLOOD PRESSURE: 73 MMHG

## 2025-09-04 DIAGNOSIS — F90.2 ADHD (ATTENTION DEFICIT HYPERACTIVITY DISORDER), COMBINED TYPE: ICD-10-CM

## 2025-09-04 DIAGNOSIS — F32.A DEPRESSION, UNSPECIFIED DEPRESSION TYPE: ICD-10-CM

## 2025-09-04 DIAGNOSIS — F41.9 ANXIETY: Primary | ICD-10-CM

## 2025-09-04 ASSESSMENT — ANXIETY QUESTIONNAIRES
GAD7 TOTAL SCORE: 7
IF YOU CHECKED OFF ANY PROBLEMS ON THIS QUESTIONNAIRE, HOW DIFFICULT HAVE THESE PROBLEMS MADE IT FOR YOU TO DO YOUR WORK, TAKE CARE OF THINGS AT HOME, OR GET ALONG WITH OTHER PEOPLE: NOT DIFFICULT AT ALL
5. BEING SO RESTLESS THAT IT IS HARD TO SIT STILL: NOT AT ALL
4. TROUBLE RELAXING: SEVERAL DAYS
GAD7 TOTAL SCORE: 7
1. FEELING NERVOUS, ANXIOUS, OR ON EDGE: MORE THAN HALF THE DAYS
7. FEELING AFRAID AS IF SOMETHING AWFUL MIGHT HAPPEN: SEVERAL DAYS
2. NOT BEING ABLE TO STOP OR CONTROL WORRYING: SEVERAL DAYS
6. BECOMING EASILY ANNOYED OR IRRITABLE: SEVERAL DAYS
7. FEELING AFRAID AS IF SOMETHING AWFUL MIGHT HAPPEN: SEVERAL DAYS
3. WORRYING TOO MUCH ABOUT DIFFERENT THINGS: SEVERAL DAYS
GAD7 TOTAL SCORE: 7
8. IF YOU CHECKED OFF ANY PROBLEMS, HOW DIFFICULT HAVE THESE MADE IT FOR YOU TO DO YOUR WORK, TAKE CARE OF THINGS AT HOME, OR GET ALONG WITH OTHER PEOPLE?: NOT DIFFICULT AT ALL

## 2025-09-04 ASSESSMENT — PATIENT HEALTH QUESTIONNAIRE - PHQ9
SUM OF ALL RESPONSES TO PHQ QUESTIONS 1-9: 6
10. IF YOU CHECKED OFF ANY PROBLEMS, HOW DIFFICULT HAVE THESE PROBLEMS MADE IT FOR YOU TO DO YOUR WORK, TAKE CARE OF THINGS AT HOME, OR GET ALONG WITH OTHER PEOPLE: SOMEWHAT DIFFICULT
SUM OF ALL RESPONSES TO PHQ QUESTIONS 1-9: 6

## 2025-09-04 ASSESSMENT — PAIN SCALES - GENERAL: PAINLEVEL_OUTOF10: NO PAIN (0)

## (undated) DEVICE — DRSG STERI STRIP 1/2X4" R1547

## (undated) DEVICE — ESU PENCIL SMOKE EVAC W/ROCKER SWITCH 0703-047-000

## (undated) DEVICE — PAD CHUX UNDERPAD 30X36" P3036C

## (undated) DEVICE — ADH LIQUID MASTISOL TOPICAL VIAL 2-3ML 0523-48

## (undated) DEVICE — PREP CHLORAPREP 26ML TINTED ORANGE  260815

## (undated) DEVICE — SU VICRYL 3-0 CT 36" J356H

## (undated) DEVICE — GLOVE PROTEXIS BLUE W/NEU-THERA 8.0  2D73EB80

## (undated) DEVICE — GLOVE PROTEXIS POWDER FREE SMT 7.5  2D72PT75X

## (undated) DEVICE — ESU GROUND PAD ADULT W/CORD E7507

## (undated) DEVICE — PAD ABD 5X9" STERILE 9190A

## (undated) DEVICE — STPL SKIN SUBCUTICULAR INSORB  2030

## (undated) DEVICE — SLEEVE COMPRESSION SCD KNEE MED 74022

## (undated) DEVICE — Device

## (undated) DEVICE — SU CHROMIC 1 CTX 36" 905H

## (undated) DEVICE — DRSG MEDIPORE 3 1/2X8" 3570

## (undated) DEVICE — SU VICRYL 0 CTX 36" J370H

## (undated) DEVICE — SU PDS II 0 CTX 60" Z990G

## (undated) DEVICE — SOL WATER 1500ML

## (undated) DEVICE — PACK C SECTION SMA15CSFCA

## (undated) RX ORDER — PHENYLEPHRINE HCL IN 0.9% NACL 50MG/250ML
PLASTIC BAG, INJECTION (ML) INTRAVENOUS
Status: DISPENSED
Start: 2023-05-13

## (undated) RX ORDER — ONDANSETRON 2 MG/ML
INJECTION INTRAMUSCULAR; INTRAVENOUS
Status: DISPENSED
Start: 2023-05-13

## (undated) RX ORDER — AZITHROMYCIN 500 MG/5ML
INJECTION, POWDER, LYOPHILIZED, FOR SOLUTION INTRAVENOUS
Status: DISPENSED
Start: 2023-05-13

## (undated) RX ORDER — OXYTOCIN/0.9 % SODIUM CHLORIDE 30/500 ML
PLASTIC BAG, INJECTION (ML) INTRAVENOUS
Status: DISPENSED
Start: 2023-05-13

## (undated) RX ORDER — CARBOPROST TROMETHAMINE 250 UG/ML
INJECTION, SOLUTION INTRAMUSCULAR
Status: DISPENSED
Start: 2023-05-13

## (undated) RX ORDER — AZITHROMYCIN 500 MG/1
INJECTION, POWDER, LYOPHILIZED, FOR SOLUTION INTRAVENOUS
Status: DISPENSED
Start: 2023-05-13

## (undated) RX ORDER — METHYLERGONOVINE MALEATE 0.2 MG/ML
INJECTION INTRAVENOUS
Status: DISPENSED
Start: 2023-05-13

## (undated) RX ORDER — SODIUM CHLORIDE 9 MG/ML
INJECTION, SOLUTION INTRAVENOUS
Status: DISPENSED
Start: 2023-05-13

## (undated) RX ORDER — MORPHINE SULFATE 0.5 MG/ML
INJECTION, SOLUTION EPIDURAL; INTRATHECAL; INTRAVENOUS
Status: DISPENSED
Start: 2023-05-13

## (undated) RX ORDER — OXYTOCIN 10 [USP'U]/ML
INJECTION, SOLUTION INTRAMUSCULAR; INTRAVENOUS
Status: DISPENSED
Start: 2023-05-13

## (undated) RX ORDER — TRANEXAMIC ACID 10 MG/ML
INJECTION, SOLUTION INTRAVENOUS
Status: DISPENSED
Start: 2023-05-13